# Patient Record
Sex: FEMALE | Race: WHITE | Employment: FULL TIME | URBAN - METROPOLITAN AREA
[De-identification: names, ages, dates, MRNs, and addresses within clinical notes are randomized per-mention and may not be internally consistent; named-entity substitution may affect disease eponyms.]

---

## 2018-10-29 ENCOUNTER — OFFICE VISIT (OUTPATIENT)
Dept: FAMILY MEDICINE CLINIC | Facility: CLINIC | Age: 33
End: 2018-10-29

## 2018-10-29 VITALS
DIASTOLIC BLOOD PRESSURE: 64 MMHG | HEART RATE: 65 BPM | HEIGHT: 61 IN | WEIGHT: 138 LBS | SYSTOLIC BLOOD PRESSURE: 106 MMHG | BODY MASS INDEX: 26.06 KG/M2 | TEMPERATURE: 99 F

## 2018-10-29 DIAGNOSIS — Z23 NEED FOR VACCINATION: ICD-10-CM

## 2018-10-29 DIAGNOSIS — Z78.9 VEGAN DIET: ICD-10-CM

## 2018-10-29 DIAGNOSIS — Z00.00 ADULT GENERAL MEDICAL EXAM: Primary | ICD-10-CM

## 2018-10-29 DIAGNOSIS — L65.9 HAIR THINNING: ICD-10-CM

## 2018-10-29 PROCEDURE — 90471 IMMUNIZATION ADMIN: CPT | Performed by: FAMILY MEDICINE

## 2018-10-29 PROCEDURE — 99385 PREV VISIT NEW AGE 18-39: CPT | Performed by: FAMILY MEDICINE

## 2018-10-29 PROCEDURE — 90686 IIV4 VACC NO PRSV 0.5 ML IM: CPT | Performed by: FAMILY MEDICINE

## 2018-10-29 NOTE — PROGRESS NOTES
Patient ID: Sohail Jha is a 29year old female. HPI  Patient presents with:  Physical    Is not been seen in at least 3 years. She has 2 children. She sees Dr. Ran Osman her gynecologist.  She is . She works in marketing and sales.   She goes 06/14/2014     06/14/2014    CO2 27 06/14/2014       Lab Results   Component Value Date    GLU 89 06/14/2014    BUN 10 06/14/2014    CREATSERUM 0.90 06/14/2014    BUNCREA 11.1 06/14/2014    ANIONGAP 5 06/14/2014    GFRAA > 60 06/14/2014    GFRNAA > 6 Readings from Last 6 Encounters:  10/29/18 : 26.07 kg/m²  11/18/16 : 24.75 kg/m²  11/30/15 : 24.98 kg/m²  12/06/14 : 23.88 kg/m²  11/18/14 : 23.91 kg/m²  09/03/14 : 23.24 kg/m²      BP Readings from Last 6 Encounters:  10/29/18 : 106/64  11/18/16 : 100/65 History      Not on file    Tobacco Use      Smoking status: Never Smoker      Smokeless tobacco: Never Used    Substance and Sexual Activity      Alcohol use:  Yes        Alcohol/week: 0.0 oz        Comment: Beer and wine socially      Drug use: No      Se Psychiatric: She has a normal mood and affect  No scoliosis  Vitals reviewed. Blood pressure 106/64, pulse 65, temperature 99.3 °F (37.4 °C), temperature source Oral, height 5' 1\" (1.549 m), weight 138 lb (62.6 kg), not currently breastfeeding.

## 2018-11-03 ENCOUNTER — LAB ENCOUNTER (OUTPATIENT)
Dept: LAB | Age: 33
End: 2018-11-03
Attending: FAMILY MEDICINE
Payer: COMMERCIAL

## 2018-11-03 DIAGNOSIS — Z78.9 VEGAN DIET: ICD-10-CM

## 2018-11-03 DIAGNOSIS — L65.9 HAIR THINNING: ICD-10-CM

## 2018-11-03 DIAGNOSIS — Z00.00 ADULT GENERAL MEDICAL EXAM: ICD-10-CM

## 2018-11-03 PROCEDURE — 82607 VITAMIN B-12: CPT

## 2018-11-03 PROCEDURE — 36415 COLL VENOUS BLD VENIPUNCTURE: CPT

## 2018-11-03 PROCEDURE — 84443 ASSAY THYROID STIM HORMONE: CPT

## 2018-11-03 PROCEDURE — 80061 LIPID PANEL: CPT

## 2018-11-03 PROCEDURE — 85025 COMPLETE CBC W/AUTO DIFF WBC: CPT

## 2018-11-03 PROCEDURE — 80053 COMPREHEN METABOLIC PANEL: CPT

## 2018-11-03 PROCEDURE — 82306 VITAMIN D 25 HYDROXY: CPT

## 2018-11-26 ENCOUNTER — OFFICE VISIT (OUTPATIENT)
Dept: OBGYN CLINIC | Facility: CLINIC | Age: 33
End: 2018-11-26

## 2018-11-26 VITALS
WEIGHT: 136 LBS | BODY MASS INDEX: 26 KG/M2 | SYSTOLIC BLOOD PRESSURE: 95 MMHG | DIASTOLIC BLOOD PRESSURE: 63 MMHG | HEART RATE: 60 BPM

## 2018-11-26 DIAGNOSIS — Z01.419 WOMEN'S ANNUAL ROUTINE GYNECOLOGICAL EXAMINATION: Primary | ICD-10-CM

## 2018-11-26 PROCEDURE — 99395 PREV VISIT EST AGE 18-39: CPT | Performed by: OBSTETRICS & GYNECOLOGY

## 2018-11-29 NOTE — PROGRESS NOTES
Alexander Bautista is a 29year old female [id number] Patient's last menstrual period was 10/28/2018. Patient presents with:  Gyn Exam: ANNUAL  STD: wishes for testing on pap only  .     OBSTETRICS HISTORY:  OB History    Para Term  AB Living   2 2 Transfusions: Not Asked        Caffeine Concern: Yes          1-2 cups of coffee daily        Occupational Exposure: Not Asked        Hobby Hazards: Not Asked        Sleep Concern: Not Asked        Stress Concern: Not Asked        Weight Concern: Not Asked nipple retraction or skin changes  Abdomen:   soft, nontender, nondistended, no masses  Skin/Hair:  no unusual rashes or bruises  Extremities:  no edema, no cyanosis  Psychiatric:   oriented to time, place, person and situation.  Appropriate mood and affect

## 2018-11-30 ENCOUNTER — APPOINTMENT (OUTPATIENT)
Dept: GENERAL RADIOLOGY | Facility: HOSPITAL | Age: 33
End: 2018-11-30
Payer: COMMERCIAL

## 2018-11-30 ENCOUNTER — HOSPITAL ENCOUNTER (EMERGENCY)
Facility: HOSPITAL | Age: 33
Discharge: HOME | End: 2018-11-30
Attending: EMERGENCY MEDICINE
Payer: COMMERCIAL

## 2018-11-30 VITALS
HEIGHT: 61 IN | HEART RATE: 69 BPM | BODY MASS INDEX: 25.68 KG/M2 | TEMPERATURE: 97 F | DIASTOLIC BLOOD PRESSURE: 67 MMHG | RESPIRATION RATE: 18 BRPM | OXYGEN SATURATION: 100 % | SYSTOLIC BLOOD PRESSURE: 112 MMHG | WEIGHT: 136 LBS

## 2018-11-30 DIAGNOSIS — S50.02XA CONTUSION OF LEFT ELBOW, INITIAL ENCOUNTER: Primary | ICD-10-CM

## 2018-11-30 PROCEDURE — 99283 EMERGENCY DEPT VISIT LOW MDM: CPT

## 2018-11-30 PROCEDURE — 73080 X-RAY EXAM OF ELBOW: CPT | Performed by: EMERGENCY MEDICINE

## 2018-11-30 RX ORDER — IBUPROFEN 600 MG/1
600 TABLET ORAL ONCE
Status: COMPLETED | OUTPATIENT
Start: 2018-11-30 | End: 2018-11-30

## 2018-11-30 NOTE — ED PROVIDER NOTES
Patient Seen in: [facility] AND [facility] Emergency Department    History   Patient presents with:  Fall (musculoskeletal, neurologic)    Stated Complaint: mechanical fall 5 stairs    HPI    70-year-old female without significant past medical history presents w external or internal trauma by exam.  Neurological: Speech normal.  Motor and sensation is intact and symmetric to bilateral upper extremities. Skin: No laceration or abrasions.   Musculoskeletal                Head: atraumatic without scalp tenderness

## 2018-11-30 NOTE — ED INITIAL ASSESSMENT (HPI)
Pt fell this morning down 5 concrete stairs. C/o shooting pain in L elbow radiating down to L hand. Denies hitting head.

## 2019-05-27 ENCOUNTER — HOSPITAL ENCOUNTER (EMERGENCY)
Facility: HOSPITAL | Age: 34
Discharge: HOME | End: 2019-05-27
Attending: PHYSICIAN ASSISTANT
Payer: COMMERCIAL

## 2019-05-27 VITALS
RESPIRATION RATE: 16 BRPM | HEIGHT: 61 IN | SYSTOLIC BLOOD PRESSURE: 101 MMHG | HEART RATE: 59 BPM | TEMPERATURE: 98 F | WEIGHT: 134 LBS | OXYGEN SATURATION: 99 % | DIASTOLIC BLOOD PRESSURE: 64 MMHG | BODY MASS INDEX: 25.3 KG/M2

## 2019-05-27 DIAGNOSIS — K08.89 PAIN, DENTAL: Primary | ICD-10-CM

## 2019-05-27 PROCEDURE — 99283 EMERGENCY DEPT VISIT LOW MDM: CPT

## 2019-05-27 RX ORDER — HYDROCODONE BITARTRATE AND ACETAMINOPHEN 5; 325 MG/1; MG/1
1 TABLET ORAL EVERY 6 HOURS PRN
Qty: 12 TABLET | Refills: 0 | Status: SHIPPED | OUTPATIENT
Start: 2019-05-27 | End: 2019-06-03

## 2019-05-27 RX ORDER — HYDROCODONE BITARTRATE AND ACETAMINOPHEN 5; 325 MG/1; MG/1
1 TABLET ORAL ONCE
Status: COMPLETED | OUTPATIENT
Start: 2019-05-27 | End: 2019-05-27

## 2019-05-27 NOTE — ED PROVIDER NOTES
Patient Seen in: [facility] Emergency Department    History   Patient presents with:  Dental Problem (dental)    Stated Complaint: pain from wisdom teeth removal     HPI    Kenia Castañeda is a 29year old female with chief complaint right lower o 100 %   O2 Device None (Room air)       Current:/64   Pulse 59   Temp 98 °F (36.7 °C) (Temporal)   Resp 16   Ht 154.9 cm (5' 1\")   Wt 60.8 kg   LMP  (Within Weeks)   SpO2 99%   BMI 25.32 kg/m²       PULSE OX within normal limits on room air as inter and Plan     Clinical Impression:  Pain, dental  (primary encounter diagnosis)    Disposition:  Discharge    Follow-up:  Your dentist or oral surgeon    Schedule an appointment as soon as possible for a visit in 1 day  For follow-up    We recommend that yo

## 2019-05-27 NOTE — ED INITIAL ASSESSMENT (HPI)
Pt had wisdom teeth removed on Thursday, was given tramadol, tylenol #3, ibuprofen 600mg (which she last had at 0700) and amoxicillin + steroids. Pt states she is taking her antibiotics and pain medicine, finished steroids, still having pain.  Eduar canseco

## 2020-02-05 ENCOUNTER — OFFICE VISIT (OUTPATIENT)
Dept: OBGYN CLINIC | Facility: CLINIC | Age: 35
End: 2020-02-05

## 2020-02-05 VITALS
HEART RATE: 59 BPM | SYSTOLIC BLOOD PRESSURE: 106 MMHG | DIASTOLIC BLOOD PRESSURE: 73 MMHG | HEIGHT: 61 IN | WEIGHT: 137 LBS | BODY MASS INDEX: 25.86 KG/M2

## 2020-02-05 DIAGNOSIS — Z11.3 SCREEN FOR STD (SEXUALLY TRANSMITTED DISEASE): ICD-10-CM

## 2020-02-05 DIAGNOSIS — Z01.419 ENCOUNTER FOR GYNECOLOGICAL EXAMINATION WITHOUT ABNORMAL FINDING: Primary | ICD-10-CM

## 2020-02-05 PROCEDURE — 99395 PREV VISIT EST AGE 18-39: CPT | Performed by: OBSTETRICS & GYNECOLOGY

## 2020-02-06 LAB
C TRACH DNA SPEC QL NAA+PROBE: NEGATIVE
N GONORRHOEA DNA SPEC QL NAA+PROBE: NEGATIVE

## 2020-02-07 LAB — T VAGINALIS RRNA SPEC QL NAA+PROBE: NEGATIVE

## 2020-02-10 NOTE — PROGRESS NOTES
Kenia Castañeda is a 36year old female [id number] Patient's last menstrual period was 2020. Patient presents with:  Gyn Exam: Toño Person  Std Screen: wishes for full screen  .     OBSTETRICS HISTORY:  OB History    Para Term P Minutes per session: Not on file      Stress: Not on file    Relationships      Social connections:        Talks on phone: Not on file        Gets together: Not on file        Attends [employer] service: Not on file        Active member of club or organizat thoughts of harming self or others  Heme/Lymph:    denies easy bruising or bleeding      PHYSICAL EXAM:   Blood pressure 106/73, pulse 59, height 5' 1\" (1.549 m), weight 137 lb (62.1 kg), last menstrual period 02/01/2020, not currently breastfeeding.   Con Hep B, Hep C, Trep, GC/Chl/Trich screening ordered. Condoms encouraged.

## 2020-03-09 ENCOUNTER — OFFICE VISIT (OUTPATIENT)
Dept: FAMILY MEDICINE CLINIC | Facility: CLINIC | Age: 35
End: 2020-03-09

## 2020-03-09 ENCOUNTER — LAB ENCOUNTER (OUTPATIENT)
Dept: LAB | Age: 35
End: 2020-03-09
Attending: FAMILY MEDICINE
Payer: COMMERCIAL

## 2020-03-09 VITALS
DIASTOLIC BLOOD PRESSURE: 73 MMHG | HEIGHT: 61 IN | BODY MASS INDEX: 26.21 KG/M2 | WEIGHT: 138.81 LBS | SYSTOLIC BLOOD PRESSURE: 109 MMHG | TEMPERATURE: 98 F | HEART RATE: 60 BPM

## 2020-03-09 DIAGNOSIS — Z78.9 VEGETARIAN DIET: ICD-10-CM

## 2020-03-09 DIAGNOSIS — R06.83 SNORING: ICD-10-CM

## 2020-03-09 DIAGNOSIS — Z11.3 SCREEN FOR STD (SEXUALLY TRANSMITTED DISEASE): ICD-10-CM

## 2020-03-09 DIAGNOSIS — Z00.00 ADULT GENERAL MEDICAL EXAM: Primary | ICD-10-CM

## 2020-03-09 DIAGNOSIS — Z00.00 ADULT GENERAL MEDICAL EXAM: ICD-10-CM

## 2020-03-09 DIAGNOSIS — M25.551 ACUTE RIGHT HIP PAIN: ICD-10-CM

## 2020-03-09 DIAGNOSIS — R19.4 DECREASED FREQUENCY OF BOWEL MOVEMENTS: ICD-10-CM

## 2020-03-09 LAB
ALBUMIN SERPL-MCNC: 3.9 G/DL (ref 3.4–5)
ALBUMIN/GLOB SERPL: 1 {RATIO} (ref 1–2)
ALP LIVER SERPL-CCNC: 51 U/L (ref 37–98)
ALT SERPL-CCNC: 16 U/L (ref 13–56)
ANION GAP SERPL CALC-SCNC: 3 MMOL/L (ref 0–18)
AST SERPL-CCNC: 7 U/L (ref 15–37)
BASOPHILS # BLD AUTO: 0.03 X10(3) UL (ref 0–0.2)
BASOPHILS NFR BLD AUTO: 0.5 %
BILIRUB SERPL-MCNC: 0.4 MG/DL (ref 0.1–2)
BUN BLD-MCNC: 10 MG/DL (ref 7–18)
BUN/CREAT SERPL: 13.2 (ref 10–20)
CALCIUM BLD-MCNC: 9.2 MG/DL (ref 8.5–10.1)
CHLORIDE SERPL-SCNC: 106 MMOL/L (ref 98–112)
CHOLEST SMN-MCNC: 198 MG/DL (ref ?–200)
CO2 SERPL-SCNC: 29 MMOL/L (ref 21–32)
CREAT BLD-MCNC: 0.76 MG/DL (ref 0.55–1.02)
DEPRECATED RDW RBC AUTO: 43.3 FL (ref 35.1–46.3)
EOSINOPHIL # BLD AUTO: 0.09 X10(3) UL (ref 0–0.7)
EOSINOPHIL NFR BLD AUTO: 1.5 %
ERYTHROCYTE [DISTWIDTH] IN BLOOD BY AUTOMATED COUNT: 12.4 % (ref 11–15)
GLOBULIN PLAS-MCNC: 3.9 G/DL (ref 2.8–4.4)
GLUCOSE BLD-MCNC: 83 MG/DL (ref 70–99)
HBV SURFACE AG SER-ACNC: <0.1 [IU]/L
HBV SURFACE AG SERPL QL IA: NONREACTIVE
HCT VFR BLD AUTO: 41.1 % (ref 35–48)
HCV AB SERPL QL IA: NONREACTIVE
HDLC SERPL-MCNC: 60 MG/DL (ref 40–59)
HGB BLD-MCNC: 13.8 G/DL (ref 12–16)
IMM GRANULOCYTES # BLD AUTO: 0.01 X10(3) UL (ref 0–1)
IMM GRANULOCYTES NFR BLD: 0.2 %
LDLC SERPL CALC-MCNC: 122 MG/DL (ref ?–100)
LYMPHOCYTES # BLD AUTO: 2.28 X10(3) UL (ref 1–4)
LYMPHOCYTES NFR BLD AUTO: 38.5 %
M PROTEIN MFR SERPL ELPH: 7.8 G/DL (ref 6.4–8.2)
MCH RBC QN AUTO: 31.7 PG (ref 26–34)
MCHC RBC AUTO-ENTMCNC: 33.6 G/DL (ref 31–37)
MCV RBC AUTO: 94.5 FL (ref 80–100)
MONOCYTES # BLD AUTO: 0.4 X10(3) UL (ref 0.1–1)
MONOCYTES NFR BLD AUTO: 6.8 %
NEUTROPHILS # BLD AUTO: 3.11 X10 (3) UL (ref 1.5–7.7)
NEUTROPHILS # BLD AUTO: 3.11 X10(3) UL (ref 1.5–7.7)
NEUTROPHILS NFR BLD AUTO: 52.5 %
NONHDLC SERPL-MCNC: 138 MG/DL (ref ?–130)
OSMOLALITY SERPL CALC.SUM OF ELEC: 284 MOSM/KG (ref 275–295)
PATIENT FASTING Y/N/NP: YES
PATIENT FASTING Y/N/NP: YES
PLATELET # BLD AUTO: 301 10(3)UL (ref 150–450)
POTASSIUM SERPL-SCNC: 4.2 MMOL/L (ref 3.5–5.1)
RBC # BLD AUTO: 4.35 X10(6)UL (ref 3.8–5.3)
SODIUM SERPL-SCNC: 138 MMOL/L (ref 136–145)
TRIGL SERPL-MCNC: 81 MG/DL (ref 30–149)
TSI SER-ACNC: 2.06 MIU/ML (ref 0.36–3.74)
VIT B12 SERPL-MCNC: 412 PG/ML (ref 193–986)
VLDLC SERPL CALC-MCNC: 16 MG/DL (ref 0–30)
WBC # BLD AUTO: 5.9 X10(3) UL (ref 4–11)

## 2020-03-09 PROCEDURE — 84443 ASSAY THYROID STIM HORMONE: CPT

## 2020-03-09 PROCEDURE — 36415 COLL VENOUS BLD VENIPUNCTURE: CPT

## 2020-03-09 PROCEDURE — 80061 LIPID PANEL: CPT

## 2020-03-09 PROCEDURE — 86803 HEPATITIS C AB TEST: CPT

## 2020-03-09 PROCEDURE — 80053 COMPREHEN METABOLIC PANEL: CPT

## 2020-03-09 PROCEDURE — 86780 TREPONEMA PALLIDUM: CPT

## 2020-03-09 PROCEDURE — 85025 COMPLETE CBC W/AUTO DIFF WBC: CPT

## 2020-03-09 PROCEDURE — 99395 PREV VISIT EST AGE 18-39: CPT | Performed by: FAMILY MEDICINE

## 2020-03-09 PROCEDURE — 87340 HEPATITIS B SURFACE AG IA: CPT

## 2020-03-09 PROCEDURE — 82607 VITAMIN B-12: CPT

## 2020-03-09 PROCEDURE — 87389 HIV-1 AG W/HIV-1&-2 AB AG IA: CPT

## 2020-03-10 ENCOUNTER — OFFICE VISIT (OUTPATIENT)
Dept: OTOLARYNGOLOGY | Facility: CLINIC | Age: 35
End: 2020-03-10

## 2020-03-10 VITALS
HEIGHT: 61 IN | WEIGHT: 138 LBS | TEMPERATURE: 98 F | DIASTOLIC BLOOD PRESSURE: 56 MMHG | BODY MASS INDEX: 26.06 KG/M2 | SYSTOLIC BLOOD PRESSURE: 88 MMHG

## 2020-03-10 DIAGNOSIS — R06.83 SNORING: Primary | ICD-10-CM

## 2020-03-10 PROCEDURE — 99243 OFF/OP CNSLTJ NEW/EST LOW 30: CPT | Performed by: OTOLARYNGOLOGY

## 2020-03-11 LAB — T PALLIDUM AB SER QL: NEGATIVE

## 2020-03-11 NOTE — PROGRESS NOTES
Ariel Mars is a 36year old female. Patient presents with:  Consult: snoring ,patient  tells her she is snoring often    HPI:   Her  is telling her over the last few years that she has been snoring more.   She is not really aware if she i Eyes Normal Conjunctiva - Right: Normal, Left: Normal. Pupil - Right: Normal, Left: Normal.    Ears Normal Inspection - Right: Normal, Left: Normal. Canal - Left: Normal. TM - Right: Normal, Left: Normal.     ASSESSMENT AND PLAN:   1.  Snoring  She is hav

## 2020-09-11 ENCOUNTER — TELEPHONE (OUTPATIENT)
Dept: OBGYN CLINIC | Facility: CLINIC | Age: 35
End: 2020-09-11

## 2020-09-11 NOTE — TELEPHONE ENCOUNTER
Patient has a bump in her bikini line for a month that has not gone away. Patient requesting appointment with any of the female doctors. Offered Saturday appointment with , but patient declined to to work. Please call at:[phone],thanks.

## 2020-09-11 NOTE — TELEPHONE ENCOUNTER
C/O DID HER OWN BIKINI WAX AND DEVELOPED A LUMP ON VULVA. SHE POPPED IT BUT IT CAME BACK AND IS BIGGER, HARDER AND VERY SORE. APPT GIVEN WITH RONNY ON MON. SUGGESTED WARM SOAKS TO AREA FREQUENTLY EACH DAY.   IF AREA RUPTURES TO USE WARM SOAKS 1-2 MORE TIME

## 2020-09-14 ENCOUNTER — OFFICE VISIT (OUTPATIENT)
Dept: OBGYN CLINIC | Facility: CLINIC | Age: 35
End: 2020-09-14

## 2020-09-14 VITALS
DIASTOLIC BLOOD PRESSURE: 61 MMHG | HEART RATE: 61 BPM | SYSTOLIC BLOOD PRESSURE: 101 MMHG | BODY MASS INDEX: 26 KG/M2 | WEIGHT: 138 LBS

## 2020-09-14 DIAGNOSIS — L73.9 FOLLICULITIS: Primary | ICD-10-CM

## 2020-09-14 PROCEDURE — 3074F SYST BP LT 130 MM HG: CPT | Performed by: OBSTETRICS & GYNECOLOGY

## 2020-09-14 PROCEDURE — 99213 OFFICE O/P EST LOW 20 MIN: CPT | Performed by: OBSTETRICS & GYNECOLOGY

## 2020-09-14 PROCEDURE — 3078F DIAST BP <80 MM HG: CPT | Performed by: OBSTETRICS & GYNECOLOGY

## 2020-09-14 RX ORDER — SULFAMETHOXAZOLE AND TRIMETHOPRIM 800; 160 MG/1; MG/1
1 TABLET ORAL 2 TIMES DAILY
Qty: 14 TABLET | Refills: 0 | Status: SHIPPED | OUTPATIENT
Start: 2020-09-14 | End: 2020-09-24

## 2020-09-16 NOTE — PROGRESS NOTES
Juliette Santillan is a 30year old female [id number] Patient's last menstrual period was 09/07/2020. Patient presents with:  Gyn Problem: VAGINAL LUMP -- bathtub not working thus only using heating pad. Noticed after shaving. painful  .      OBSTETRICS HISTORY: Relationship status: Not on file      Intimate partner violence:        Fear of current or ex partner: Not on file        Emotionally abused: Not on file        Physically abused: Not on file        Forced sexual activity: Not on file    Other Topics Abdomen:    soft, nontender, nondistended, no masses  Skin/Hair:   no unusual rashes or bruises  Extremities:   no edema, no cyanosis  Psychiatric:    oriented to time, place, person and situation.  Appropriate mood and affect    Pelvic Exam:  External Irais

## 2021-03-11 ENCOUNTER — LAB ENCOUNTER (OUTPATIENT)
Dept: LAB | Age: 36
End: 2021-03-11
Attending: FAMILY MEDICINE
Payer: COMMERCIAL

## 2021-03-11 ENCOUNTER — OFFICE VISIT (OUTPATIENT)
Dept: FAMILY MEDICINE CLINIC | Facility: CLINIC | Age: 36
End: 2021-03-11

## 2021-03-11 VITALS
BODY MASS INDEX: 26.06 KG/M2 | TEMPERATURE: 98 F | SYSTOLIC BLOOD PRESSURE: 98 MMHG | WEIGHT: 138 LBS | HEART RATE: 69 BPM | DIASTOLIC BLOOD PRESSURE: 63 MMHG | HEIGHT: 61 IN

## 2021-03-11 DIAGNOSIS — R06.83 SNORING: ICD-10-CM

## 2021-03-11 DIAGNOSIS — Z00.00 ADULT GENERAL MEDICAL EXAM: ICD-10-CM

## 2021-03-11 DIAGNOSIS — Z11.3 SCREEN FOR STD (SEXUALLY TRANSMITTED DISEASE): ICD-10-CM

## 2021-03-11 DIAGNOSIS — Z00.00 ADULT GENERAL MEDICAL EXAM: Primary | ICD-10-CM

## 2021-03-11 DIAGNOSIS — R20.0 NUMBNESS OF RIGHT FOOT: ICD-10-CM

## 2021-03-11 DIAGNOSIS — L98.9 BENIGN SKIN LESION OF FACE: ICD-10-CM

## 2021-03-11 LAB
ALBUMIN SERPL-MCNC: 4 G/DL (ref 3.4–5)
ALBUMIN/GLOB SERPL: 1.1 {RATIO} (ref 1–2)
ALP LIVER SERPL-CCNC: 51 U/L
ALT SERPL-CCNC: 30 U/L
ANION GAP SERPL CALC-SCNC: 5 MMOL/L (ref 0–18)
AST SERPL-CCNC: 17 U/L (ref 15–37)
BASOPHILS # BLD AUTO: 0.04 X10(3) UL (ref 0–0.2)
BASOPHILS NFR BLD AUTO: 0.7 %
BILIRUB SERPL-MCNC: 0.5 MG/DL (ref 0.1–2)
BUN BLD-MCNC: 10 MG/DL (ref 7–18)
BUN/CREAT SERPL: 12.2 (ref 10–20)
CALCIUM BLD-MCNC: 8.9 MG/DL (ref 8.5–10.1)
CHLORIDE SERPL-SCNC: 106 MMOL/L (ref 98–112)
CHOLEST SMN-MCNC: 156 MG/DL (ref ?–200)
CO2 SERPL-SCNC: 27 MMOL/L (ref 21–32)
CREAT BLD-MCNC: 0.82 MG/DL
DEPRECATED RDW RBC AUTO: 43.5 FL (ref 35.1–46.3)
EOSINOPHIL # BLD AUTO: 0.05 X10(3) UL (ref 0–0.7)
EOSINOPHIL NFR BLD AUTO: 0.9 %
ERYTHROCYTE [DISTWIDTH] IN BLOOD BY AUTOMATED COUNT: 12.7 % (ref 11–15)
GLOBULIN PLAS-MCNC: 3.5 G/DL (ref 2.8–4.4)
GLUCOSE BLD-MCNC: 84 MG/DL (ref 70–99)
HCT VFR BLD AUTO: 39.5 %
HDLC SERPL-MCNC: 56 MG/DL (ref 40–59)
HGB BLD-MCNC: 13.1 G/DL
IMM GRANULOCYTES # BLD AUTO: 0 X10(3) UL (ref 0–1)
IMM GRANULOCYTES NFR BLD: 0 %
LDLC SERPL CALC-MCNC: 87 MG/DL (ref ?–100)
LYMPHOCYTES # BLD AUTO: 3.1 X10(3) UL (ref 1–4)
LYMPHOCYTES NFR BLD AUTO: 56.3 %
M PROTEIN MFR SERPL ELPH: 7.5 G/DL (ref 6.4–8.2)
MCH RBC QN AUTO: 31 PG (ref 26–34)
MCHC RBC AUTO-ENTMCNC: 33.2 G/DL (ref 31–37)
MCV RBC AUTO: 93.6 FL
MONOCYTES # BLD AUTO: 0.42 X10(3) UL (ref 0.1–1)
MONOCYTES NFR BLD AUTO: 7.6 %
NEUTROPHILS # BLD AUTO: 1.9 X10 (3) UL (ref 1.5–7.7)
NEUTROPHILS # BLD AUTO: 1.9 X10(3) UL (ref 1.5–7.7)
NEUTROPHILS NFR BLD AUTO: 34.5 %
NONHDLC SERPL-MCNC: 100 MG/DL (ref ?–130)
OSMOLALITY SERPL CALC.SUM OF ELEC: 284 MOSM/KG (ref 275–295)
PATIENT FASTING Y/N/NP: YES
PATIENT FASTING Y/N/NP: YES
PLATELET # BLD AUTO: 286 10(3)UL (ref 150–450)
POTASSIUM SERPL-SCNC: 4.3 MMOL/L (ref 3.5–5.1)
RBC # BLD AUTO: 4.22 X10(6)UL
SODIUM SERPL-SCNC: 138 MMOL/L (ref 136–145)
TRIGL SERPL-MCNC: 63 MG/DL (ref 30–149)
TSI SER-ACNC: 2.05 MIU/ML (ref 0.36–3.74)
VLDLC SERPL CALC-MCNC: 13 MG/DL (ref 0–30)
WBC # BLD AUTO: 5.5 X10(3) UL (ref 4–11)

## 2021-03-11 PROCEDURE — 84443 ASSAY THYROID STIM HORMONE: CPT

## 2021-03-11 PROCEDURE — 3074F SYST BP LT 130 MM HG: CPT | Performed by: FAMILY MEDICINE

## 2021-03-11 PROCEDURE — 3078F DIAST BP <80 MM HG: CPT | Performed by: FAMILY MEDICINE

## 2021-03-11 PROCEDURE — 80061 LIPID PANEL: CPT

## 2021-03-11 PROCEDURE — 3008F BODY MASS INDEX DOCD: CPT | Performed by: FAMILY MEDICINE

## 2021-03-11 PROCEDURE — 82306 VITAMIN D 25 HYDROXY: CPT

## 2021-03-11 PROCEDURE — 80053 COMPREHEN METABOLIC PANEL: CPT

## 2021-03-11 PROCEDURE — 99213 OFFICE O/P EST LOW 20 MIN: CPT | Performed by: FAMILY MEDICINE

## 2021-03-11 PROCEDURE — 36415 COLL VENOUS BLD VENIPUNCTURE: CPT

## 2021-03-11 PROCEDURE — 99395 PREV VISIT EST AGE 18-39: CPT | Performed by: FAMILY MEDICINE

## 2021-03-11 PROCEDURE — 85025 COMPLETE CBC W/AUTO DIFF WBC: CPT

## 2021-03-11 PROCEDURE — 87389 HIV-1 AG W/HIV-1&-2 AB AG IA: CPT

## 2021-03-11 NOTE — PROGRESS NOTES
Patient ID: Fozia Ingram is a 30year old female. HPI  Patient presents with:  Routine Physical    Last physical on 3/9/2020. Pt works in marketing sales. She is .      Pt states she has been exercising at the gym and when on the elliptical 03/09/2020    BAABSO 0.03 03/09/2020       Lab Results   Component Value Date    GLU 83 03/09/2020    BUN 10 03/09/2020    BUNCREA 13.2 03/09/2020    CREATSERUM 0.76 03/09/2020    ANIONGAP 3 03/09/2020    GFRNAA 103 03/09/2020    GFRAA 119 03/09/2020    CA =======================================================    Wt Readings from Last 6 Encounters:  03/11/21 : 138 lb  09/14/20 : 138 lb  03/10/20 : 138 lb  03/09/20 : 138 lb 12.8 oz  02/05/20 : 137 lb  05/27/19 : 134 lb              BMI Readings from Last 6 Seat Belt: Not Asked        Self-Exams: Not Asked    Social History Narrative      Not on file    Social Determinants of Health  Financial Resource Strain:       Difficulty of Paying Living Expenses:   Food Insecurity:       Worried About Running Out of Fo 2/4 BLE  Skin: Skin is warm and dry. No rash noted. 8 mm by 6 mm hyperpigmented lesion  Psychiatric: She has a normal mood and affect. Right foot: No swelling or redness. Good pulses. At this time full sensation of all the toes. Vitals reviewed.     B dermatology.                            +++++++SKIN DOCTOR+++++++          Referral Priority:Routine          Referral Type:OFFICE VISIT          Referred to Waylon Ashford MD          Requested Specialty:DERMATOLOGY          Number of Visits Re

## 2021-03-12 LAB — 25(OH)D3 SERPL-MCNC: 22.8 NG/ML (ref 30–100)

## 2021-03-19 ENCOUNTER — OFFICE VISIT (OUTPATIENT)
Dept: OTOLARYNGOLOGY | Facility: CLINIC | Age: 36
End: 2021-03-19

## 2021-03-19 VITALS
DIASTOLIC BLOOD PRESSURE: 60 MMHG | BODY MASS INDEX: 26.06 KG/M2 | HEIGHT: 61 IN | TEMPERATURE: 98 F | SYSTOLIC BLOOD PRESSURE: 93 MMHG | WEIGHT: 138 LBS

## 2021-03-19 DIAGNOSIS — J34.2 DEVIATED SEPTUM: ICD-10-CM

## 2021-03-19 DIAGNOSIS — R06.83 SNORING: Primary | ICD-10-CM

## 2021-03-19 PROCEDURE — 3074F SYST BP LT 130 MM HG: CPT | Performed by: OTOLARYNGOLOGY

## 2021-03-19 PROCEDURE — 99213 OFFICE O/P EST LOW 20 MIN: CPT | Performed by: OTOLARYNGOLOGY

## 2021-03-19 PROCEDURE — 3078F DIAST BP <80 MM HG: CPT | Performed by: OTOLARYNGOLOGY

## 2021-03-19 PROCEDURE — 3008F BODY MASS INDEX DOCD: CPT | Performed by: OTOLARYNGOLOGY

## 2021-03-19 NOTE — PROGRESS NOTES
Dylan Alvarado is a 30year old female. Patient presents with: Follow - Up: 1 year follow up- snoring- per pt no changes/improvement of symptoms    HPI:   She continues to experience issues with snoring. She is not having any sleep apnea symptoms.   Last Appropriate mood and affect. Lymph Detail Normal Submental. Submandibular. Anterior cervical. Posterior cervical. Supraclavicular.    Eyes Normal Conjunctiva - Right: Normal, Left: Normal. Pupil - Right: Normal, Left: Normal.    Ears Normal Inspection - R

## 2022-02-06 LAB — AMB EXT COVID-19 RESULT: DETECTED

## 2022-02-15 ENCOUNTER — HOSPITAL ENCOUNTER (OUTPATIENT)
Dept: GENERAL RADIOLOGY | Age: 37
Discharge: HOME | End: 2022-02-15
Attending: NURSE PRACTITIONER
Payer: COMMERCIAL

## 2022-02-15 ENCOUNTER — NURSE TRIAGE (OUTPATIENT)
Dept: FAMILY MEDICINE CLINIC | Facility: CLINIC | Age: 37
End: 2022-02-15

## 2022-02-15 ENCOUNTER — OFFICE VISIT (OUTPATIENT)
Dept: FAMILY MEDICINE CLINIC | Facility: CLINIC | Age: 37
End: 2022-02-15
Payer: COMMERCIAL

## 2022-02-15 VITALS
HEIGHT: 61 IN | BODY MASS INDEX: 26.81 KG/M2 | DIASTOLIC BLOOD PRESSURE: 72 MMHG | SYSTOLIC BLOOD PRESSURE: 109 MMHG | HEART RATE: 72 BPM | WEIGHT: 142 LBS

## 2022-02-15 DIAGNOSIS — Z20.822 COUGH WITH EXPOSURE TO COVID-19 VIRUS: Primary | ICD-10-CM

## 2022-02-15 DIAGNOSIS — Z20.822 COUGH WITH EXPOSURE TO COVID-19 VIRUS: ICD-10-CM

## 2022-02-15 DIAGNOSIS — R05.8 COUGH WITH EXPOSURE TO COVID-19 VIRUS: ICD-10-CM

## 2022-02-15 DIAGNOSIS — J30.9 ALLERGIC RHINITIS, UNSPECIFIED SEASONALITY, UNSPECIFIED TRIGGER: ICD-10-CM

## 2022-02-15 DIAGNOSIS — R05.8 COUGH WITH EXPOSURE TO COVID-19 VIRUS: Primary | ICD-10-CM

## 2022-02-15 PROBLEM — R19.4 DECREASED FREQUENCY OF BOWEL MOVEMENTS: Status: RESOLVED | Noted: 2020-03-09 | Resolved: 2022-02-15

## 2022-02-15 PROBLEM — R06.83 SNORING: Status: RESOLVED | Noted: 2020-03-09 | Resolved: 2022-02-15

## 2022-02-15 PROBLEM — U07.1 COVID-19: Status: ACTIVE | Noted: 2022-02-15

## 2022-02-15 PROCEDURE — 71046 X-RAY EXAM CHEST 2 VIEWS: CPT | Performed by: NURSE PRACTITIONER

## 2022-02-15 PROCEDURE — 3008F BODY MASS INDEX DOCD: CPT | Performed by: NURSE PRACTITIONER

## 2022-02-15 PROCEDURE — 99214 OFFICE O/P EST MOD 30 MIN: CPT | Performed by: NURSE PRACTITIONER

## 2022-02-15 PROCEDURE — 3074F SYST BP LT 130 MM HG: CPT | Performed by: NURSE PRACTITIONER

## 2022-02-15 PROCEDURE — 3078F DIAST BP <80 MM HG: CPT | Performed by: NURSE PRACTITIONER

## 2022-02-15 RX ORDER — MONTELUKAST SODIUM 10 MG/1
10 TABLET ORAL DAILY
Qty: 30 TABLET | Refills: 0 | COMMUNITY
Start: 2022-02-15

## 2022-02-15 RX ORDER — GUAIFENESIN AND CODEINE PHOSPHATE 100; 10 MG/5ML; MG/5ML
SOLUTION ORAL
Qty: 180 ML | Refills: 0 | COMMUNITY
Start: 2022-02-15

## 2022-02-15 NOTE — TELEPHONE ENCOUNTER
Pt scheduled with Adama Peralta today. Pt advised to bring negative COVID test results to office visit. Dr. Theo Kent fully booked.   Future Appointments   Date Time Provider Chris Rizvi   2/15/2022  2:45 PM MICHAEL [facility]

## 2022-02-15 NOTE — ASSESSMENT & PLAN NOTE
cxr today-tx to be discussed after xray  Supportive care discussed to help alleviate symptoms  Please call if symptoms worsen or are not resolving.

## 2022-06-14 ENCOUNTER — OFFICE VISIT (OUTPATIENT)
Dept: FAMILY MEDICINE CLINIC | Facility: CLINIC | Age: 37
End: 2022-06-14
Payer: COMMERCIAL

## 2022-06-14 VITALS
DIASTOLIC BLOOD PRESSURE: 64 MMHG | RESPIRATION RATE: 16 BRPM | SYSTOLIC BLOOD PRESSURE: 99 MMHG | WEIGHT: 140 LBS | HEART RATE: 78 BPM | BODY MASS INDEX: 26.43 KG/M2 | TEMPERATURE: 98 F | HEIGHT: 61 IN

## 2022-06-14 DIAGNOSIS — M54.50 LUMBAR BACK PAIN: ICD-10-CM

## 2022-06-14 DIAGNOSIS — M54.6 ACUTE BILATERAL THORACIC BACK PAIN: Primary | ICD-10-CM

## 2022-06-14 PROCEDURE — 99213 OFFICE O/P EST LOW 20 MIN: CPT

## 2022-06-14 PROCEDURE — 3008F BODY MASS INDEX DOCD: CPT

## 2022-06-14 PROCEDURE — 3074F SYST BP LT 130 MM HG: CPT

## 2022-06-14 PROCEDURE — 3078F DIAST BP <80 MM HG: CPT

## 2022-06-14 RX ORDER — CYCLOBENZAPRINE HCL 5 MG
5 TABLET ORAL NIGHTLY
Qty: 10 TABLET | Refills: 0 | Status: SHIPPED | OUTPATIENT
Start: 2022-06-14

## 2022-06-14 RX ORDER — NAPROXEN 500 MG/1
500 TABLET ORAL 2 TIMES DAILY WITH MEALS
Qty: 28 TABLET | Refills: 0 | Status: SHIPPED | OUTPATIENT
Start: 2022-06-14 | End: 2022-06-28

## 2022-08-11 ENCOUNTER — NURSE TRIAGE (OUTPATIENT)
Dept: FAMILY MEDICINE CLINIC | Facility: CLINIC | Age: 37
End: 2022-08-11

## 2022-08-12 ENCOUNTER — OFFICE VISIT (OUTPATIENT)
Dept: FAMILY MEDICINE CLINIC | Facility: CLINIC | Age: 37
End: 2022-08-12
Payer: COMMERCIAL

## 2022-08-12 VITALS
BODY MASS INDEX: 26.43 KG/M2 | DIASTOLIC BLOOD PRESSURE: 74 MMHG | WEIGHT: 140 LBS | HEIGHT: 61 IN | HEART RATE: 60 BPM | SYSTOLIC BLOOD PRESSURE: 112 MMHG

## 2022-08-12 DIAGNOSIS — K64.4 EXTERNAL HEMORRHOID: Primary | ICD-10-CM

## 2022-08-12 PROCEDURE — 99213 OFFICE O/P EST LOW 20 MIN: CPT | Performed by: PHYSICIAN ASSISTANT

## 2022-08-12 PROCEDURE — 3078F DIAST BP <80 MM HG: CPT | Performed by: PHYSICIAN ASSISTANT

## 2022-08-12 PROCEDURE — 3074F SYST BP LT 130 MM HG: CPT | Performed by: PHYSICIAN ASSISTANT

## 2022-08-12 PROCEDURE — 3008F BODY MASS INDEX DOCD: CPT | Performed by: PHYSICIAN ASSISTANT

## 2022-08-12 RX ORDER — HYDROCORTISONE 25 MG/G
1 CREAM TOPICAL 2 TIMES DAILY
Qty: 28 G | Refills: 1 | Status: SHIPPED | OUTPATIENT
Start: 2022-08-12

## 2022-08-25 ENCOUNTER — NURSE TRIAGE (OUTPATIENT)
Dept: FAMILY MEDICINE CLINIC | Facility: CLINIC | Age: 37
End: 2022-08-25

## 2022-08-26 ENCOUNTER — OFFICE VISIT (OUTPATIENT)
Dept: FAMILY MEDICINE CLINIC | Facility: CLINIC | Age: 37
End: 2022-08-26
Payer: COMMERCIAL

## 2022-08-26 VITALS
SYSTOLIC BLOOD PRESSURE: 103 MMHG | HEIGHT: 61 IN | DIASTOLIC BLOOD PRESSURE: 70 MMHG | HEART RATE: 90 BPM | BODY MASS INDEX: 26.81 KG/M2 | WEIGHT: 142 LBS

## 2022-08-26 DIAGNOSIS — N89.8 VAGINAL DISCHARGE: Primary | ICD-10-CM

## 2022-08-26 DIAGNOSIS — T19.2XXA RETAINED TAMPON, INITIAL ENCOUNTER: ICD-10-CM

## 2022-08-26 PROCEDURE — 87808 TRICHOMONAS ASSAY W/OPTIC: CPT

## 2022-08-26 PROCEDURE — 87106 FUNGI IDENTIFICATION YEAST: CPT

## 2022-08-26 PROCEDURE — 87205 SMEAR GRAM STAIN: CPT

## 2022-08-26 RX ORDER — AMOXICILLIN AND CLAVULANATE POTASSIUM 875; 125 MG/1; MG/1
1 TABLET, FILM COATED ORAL 2 TIMES DAILY
Qty: 20 TABLET | Refills: 0 | Status: SHIPPED | OUTPATIENT
Start: 2022-08-26 | End: 2022-08-29 | Stop reason: SINTOL

## 2022-08-28 LAB
GENITAL VAGINOSIS SCREEN: NEGATIVE
TRICHOMONAS SCREEN: NEGATIVE

## 2022-08-29 ENCOUNTER — TELEPHONE (OUTPATIENT)
Dept: FAMILY MEDICINE CLINIC | Facility: CLINIC | Age: 37
End: 2022-08-29

## 2022-08-29 RX ORDER — CLINDAMYCIN HYDROCHLORIDE 300 MG/1
300 CAPSULE ORAL 3 TIMES DAILY
Qty: 30 CAPSULE | Refills: 0 | Status: SHIPPED | OUTPATIENT
Start: 2022-08-29 | End: 2022-09-08

## 2022-08-29 NOTE — TELEPHONE ENCOUNTER
Condition update:  Saw Luca Andrade APRJENNIFER on 8/26/2022, found with a tampon that had been left inside. Treated with Augmentin, started it Saturday 8/27. On Sunday 8/28 had severe diarrhea, 7 stools yesterday, 2 over night, 3 so far today. Stools are watery. Denies blood, fever. Vaginal symptoms better. She is taking probiotic. Denies abdominal cramping or unusual odor to stool. Luca Andrade MICHAEL please advise:  Patient asking if she should take AM dose of Augmentin today? Or does she need to change to different antibiotic? Discussed BRAT diet, avoid dairy, avoid raw fruits/vegetables or high roughage for now.

## 2022-08-29 NOTE — TELEPHONE ENCOUNTER
Please advise patient to stop Augmentin. Start clindamycin 300 mg three times a day x 10 days. Please continue probiotic with medication.
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
## 2022-09-03 PROBLEM — T19.2XXA RETAINED TAMPON: Status: ACTIVE | Noted: 2022-09-03

## 2022-11-11 ENCOUNTER — IMMUNIZATION (OUTPATIENT)
Dept: FAMILY MEDICINE CLINIC | Facility: CLINIC | Age: 37
End: 2022-11-11
Payer: COMMERCIAL

## 2022-11-11 DIAGNOSIS — Z23 NEED FOR VACCINATION: Primary | ICD-10-CM

## 2022-11-11 PROCEDURE — 90471 IMMUNIZATION ADMIN: CPT | Performed by: FAMILY MEDICINE

## 2022-11-11 PROCEDURE — 90686 IIV4 VACC NO PRSV 0.5 ML IM: CPT | Performed by: FAMILY MEDICINE

## 2023-01-16 ENCOUNTER — OFFICE VISIT (OUTPATIENT)
Dept: FAMILY MEDICINE CLINIC | Facility: CLINIC | Age: 38
End: 2023-01-16

## 2023-01-16 VITALS
SYSTOLIC BLOOD PRESSURE: 109 MMHG | DIASTOLIC BLOOD PRESSURE: 74 MMHG | HEIGHT: 61 IN | WEIGHT: 142 LBS | BODY MASS INDEX: 26.81 KG/M2 | TEMPERATURE: 97 F | HEART RATE: 68 BPM

## 2023-01-16 DIAGNOSIS — E55.9 VITAMIN D DEFICIENCY: ICD-10-CM

## 2023-01-16 DIAGNOSIS — Z00.00 ADULT GENERAL MEDICAL EXAM: Primary | ICD-10-CM

## 2023-01-16 DIAGNOSIS — L91.8 SKIN TAG: ICD-10-CM

## 2023-01-16 DIAGNOSIS — Z23 NEED FOR VACCINATION: ICD-10-CM

## 2023-01-16 PROCEDURE — 3078F DIAST BP <80 MM HG: CPT | Performed by: FAMILY MEDICINE

## 2023-01-16 PROCEDURE — 90471 IMMUNIZATION ADMIN: CPT | Performed by: FAMILY MEDICINE

## 2023-01-16 PROCEDURE — 99395 PREV VISIT EST AGE 18-39: CPT | Performed by: FAMILY MEDICINE

## 2023-01-16 PROCEDURE — 90715 TDAP VACCINE 7 YRS/> IM: CPT | Performed by: FAMILY MEDICINE

## 2023-01-16 PROCEDURE — 3008F BODY MASS INDEX DOCD: CPT | Performed by: FAMILY MEDICINE

## 2023-01-16 PROCEDURE — 3074F SYST BP LT 130 MM HG: CPT | Performed by: FAMILY MEDICINE

## 2023-01-16 NOTE — PATIENT INSTRUCTIONS
Start taking 2000 international units of vitamin D daily. poorly controlled w/ Hb A1c 9.2%, oddly on Levemir 15 units in AM, 20 in PM  - c/w 10 am and 15 units in pm  - c/w 2 units w/ meals   - c/w DM diet, BUD  - RD consult  - started on statin ()  - BP above goal, losartan 25 mg

## 2023-01-17 ENCOUNTER — LAB ENCOUNTER (OUTPATIENT)
Dept: LAB | Age: 38
End: 2023-01-17
Attending: FAMILY MEDICINE
Payer: COMMERCIAL

## 2023-01-17 DIAGNOSIS — Z00.00 ADULT GENERAL MEDICAL EXAM: ICD-10-CM

## 2023-01-17 DIAGNOSIS — L91.8 SKIN TAG: ICD-10-CM

## 2023-01-17 DIAGNOSIS — E55.9 VITAMIN D DEFICIENCY: ICD-10-CM

## 2023-01-17 LAB
ALBUMIN SERPL-MCNC: 3.8 G/DL (ref 3.4–5)
ALBUMIN/GLOB SERPL: 1.1 {RATIO} (ref 1–2)
ALP LIVER SERPL-CCNC: 49 U/L
ALT SERPL-CCNC: 16 U/L
ANION GAP SERPL CALC-SCNC: 8 MMOL/L (ref 0–18)
AST SERPL-CCNC: 11 U/L (ref 15–37)
BASOPHILS # BLD AUTO: 0.05 X10(3) UL (ref 0–0.2)
BASOPHILS NFR BLD AUTO: 0.6 %
BILIRUB SERPL-MCNC: 0.6 MG/DL (ref 0.1–2)
BUN BLD-MCNC: 8 MG/DL (ref 7–18)
BUN/CREAT SERPL: 9.8 (ref 10–20)
CALCIUM BLD-MCNC: 9.3 MG/DL (ref 8.5–10.1)
CHLORIDE SERPL-SCNC: 107 MMOL/L (ref 98–112)
CHOLEST SERPL-MCNC: 151 MG/DL (ref ?–200)
CO2 SERPL-SCNC: 25 MMOL/L (ref 21–32)
CREAT BLD-MCNC: 0.82 MG/DL
DEPRECATED RDW RBC AUTO: 41.7 FL (ref 35.1–46.3)
EOSINOPHIL # BLD AUTO: 0.1 X10(3) UL (ref 0–0.7)
EOSINOPHIL NFR BLD AUTO: 1.2 %
ERYTHROCYTE [DISTWIDTH] IN BLOOD BY AUTOMATED COUNT: 12.1 % (ref 11–15)
FASTING PATIENT LIPID ANSWER: YES
FASTING STATUS PATIENT QL REPORTED: YES
GFR SERPLBLD BASED ON 1.73 SQ M-ARVRAT: 95 ML/MIN/1.73M2 (ref 60–?)
GLOBULIN PLAS-MCNC: 3.4 G/DL (ref 2.8–4.4)
GLUCOSE BLD-MCNC: 101 MG/DL (ref 70–99)
HCT VFR BLD AUTO: 39.7 %
HDLC SERPL-MCNC: 54 MG/DL (ref 40–59)
HGB BLD-MCNC: 13.4 G/DL
IMM GRANULOCYTES # BLD AUTO: 0.02 X10(3) UL (ref 0–1)
IMM GRANULOCYTES NFR BLD: 0.2 %
INSULIN SERPL-ACNC: 6.8 MU/L (ref 3–25)
LDLC SERPL CALC-MCNC: 77 MG/DL (ref ?–100)
LYMPHOCYTES # BLD AUTO: 2.4 X10(3) UL (ref 1–4)
LYMPHOCYTES NFR BLD AUTO: 29.6 %
MCH RBC QN AUTO: 31.7 PG (ref 26–34)
MCHC RBC AUTO-ENTMCNC: 33.8 G/DL (ref 31–37)
MCV RBC AUTO: 93.9 FL
MONOCYTES # BLD AUTO: 0.6 X10(3) UL (ref 0.1–1)
MONOCYTES NFR BLD AUTO: 7.4 %
NEUTROPHILS # BLD AUTO: 4.94 X10 (3) UL (ref 1.5–7.7)
NEUTROPHILS # BLD AUTO: 4.94 X10(3) UL (ref 1.5–7.7)
NEUTROPHILS NFR BLD AUTO: 61 %
NONHDLC SERPL-MCNC: 97 MG/DL (ref ?–130)
OSMOLALITY SERPL CALC.SUM OF ELEC: 288 MOSM/KG (ref 275–295)
PLATELET # BLD AUTO: 298 10(3)UL (ref 150–450)
POTASSIUM SERPL-SCNC: 3.7 MMOL/L (ref 3.5–5.1)
PROT SERPL-MCNC: 7.2 G/DL (ref 6.4–8.2)
RBC # BLD AUTO: 4.23 X10(6)UL
SODIUM SERPL-SCNC: 140 MMOL/L (ref 136–145)
TRIGL SERPL-MCNC: 109 MG/DL (ref 30–149)
TSI SER-ACNC: 2.14 MIU/ML (ref 0.36–3.74)
VIT D+METAB SERPL-MCNC: 22.8 NG/ML (ref 30–100)
VLDLC SERPL CALC-MCNC: 17 MG/DL (ref 0–30)
WBC # BLD AUTO: 8.1 X10(3) UL (ref 4–11)

## 2023-01-17 PROCEDURE — 83525 ASSAY OF INSULIN: CPT

## 2023-01-17 PROCEDURE — 80053 COMPREHEN METABOLIC PANEL: CPT

## 2023-01-17 PROCEDURE — 36415 COLL VENOUS BLD VENIPUNCTURE: CPT

## 2023-01-17 PROCEDURE — 84443 ASSAY THYROID STIM HORMONE: CPT

## 2023-01-17 PROCEDURE — 82306 VITAMIN D 25 HYDROXY: CPT

## 2023-01-17 PROCEDURE — 85025 COMPLETE CBC W/AUTO DIFF WBC: CPT

## 2023-01-17 PROCEDURE — 80061 LIPID PANEL: CPT

## 2023-01-24 ENCOUNTER — OFFICE VISIT (OUTPATIENT)
Dept: OBGYN CLINIC | Facility: CLINIC | Age: 38
End: 2023-01-24

## 2023-01-24 VITALS
BODY MASS INDEX: 27 KG/M2 | HEART RATE: 62 BPM | DIASTOLIC BLOOD PRESSURE: 69 MMHG | WEIGHT: 143.38 LBS | SYSTOLIC BLOOD PRESSURE: 104 MMHG

## 2023-01-24 DIAGNOSIS — Z01.419 ENCOUNTER FOR GYNECOLOGICAL EXAMINATION WITHOUT ABNORMAL FINDING: Primary | ICD-10-CM

## 2023-01-24 PROBLEM — N89.8 VAGINAL DISCHARGE: Status: RESOLVED | Noted: 2022-08-26 | Resolved: 2023-01-24

## 2023-01-24 PROBLEM — T19.2XXA RETAINED TAMPON: Status: RESOLVED | Noted: 2022-09-03 | Resolved: 2023-01-24
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
## 2023-01-24 PROCEDURE — 99395 PREV VISIT EST AGE 18-39: CPT | Performed by: OBSTETRICS & GYNECOLOGY

## 2023-01-24 PROCEDURE — 3074F SYST BP LT 130 MM HG: CPT | Performed by: OBSTETRICS & GYNECOLOGY

## 2023-01-24 PROCEDURE — 3078F DIAST BP <80 MM HG: CPT | Performed by: OBSTETRICS & GYNECOLOGY

## 2023-01-25 LAB
C TRACH DNA SPEC QL NAA+PROBE: NEGATIVE
HPV I/H RISK 1 DNA SPEC QL NAA+PROBE: NEGATIVE
N GONORRHOEA DNA SPEC QL NAA+PROBE: NEGATIVE
T VAGINALIS RRNA SPEC QL NAA+PROBE: NEGATIVE

## 2023-03-23 ENCOUNTER — OFFICE VISIT (OUTPATIENT)
Dept: DERMATOLOGY CLINIC | Facility: CLINIC | Age: 38
End: 2023-03-23

## 2023-03-23 DIAGNOSIS — L91.8 INFLAMED ACROCHORDON: Primary | ICD-10-CM

## 2023-03-23 DIAGNOSIS — L91.8 SKIN TAGS, MULTIPLE ACQUIRED: ICD-10-CM

## 2023-03-23 PROCEDURE — 99203 OFFICE O/P NEW LOW 30 MIN: CPT | Performed by: STUDENT IN AN ORGANIZED HEALTH CARE EDUCATION/TRAINING PROGRAM

## 2023-04-05 ENCOUNTER — OFFICE VISIT (OUTPATIENT)
Dept: DERMATOLOGY CLINIC | Facility: CLINIC | Age: 38
End: 2023-04-05

## 2023-04-05 DIAGNOSIS — L91.8 SKIN TAG: Primary | ICD-10-CM

## 2023-04-05 DIAGNOSIS — L91.8 INFLAMED ACROCHORDON: ICD-10-CM

## 2023-04-05 PROCEDURE — 11201 RMVL SKIN TAGS EA ADDL 10: CPT | Performed by: STUDENT IN AN ORGANIZED HEALTH CARE EDUCATION/TRAINING PROGRAM

## 2023-04-05 PROCEDURE — 11200 RMVL SKIN TAGS UP TO&INC 15: CPT | Performed by: STUDENT IN AN ORGANIZED HEALTH CARE EDUCATION/TRAINING PROGRAM

## 2023-04-10 ENCOUNTER — OFFICE VISIT (OUTPATIENT)
Dept: OBGYN CLINIC | Facility: CLINIC | Age: 38
End: 2023-04-10

## 2023-04-10 VITALS
BODY MASS INDEX: 27 KG/M2 | WEIGHT: 142 LBS | DIASTOLIC BLOOD PRESSURE: 69 MMHG | HEART RATE: 59 BPM | SYSTOLIC BLOOD PRESSURE: 111 MMHG

## 2023-04-10 DIAGNOSIS — T19.2XXA RETAINED TAMPON NOT FOUND ON EXAMINATION: Primary | ICD-10-CM

## 2023-04-10 PROCEDURE — 3074F SYST BP LT 130 MM HG: CPT | Performed by: OBSTETRICS & GYNECOLOGY

## 2023-04-10 PROCEDURE — 99212 OFFICE O/P EST SF 10 MIN: CPT | Performed by: OBSTETRICS & GYNECOLOGY

## 2023-04-10 PROCEDURE — 3078F DIAST BP <80 MM HG: CPT | Performed by: OBSTETRICS & GYNECOLOGY

## 2023-05-03 ENCOUNTER — OFFICE VISIT (OUTPATIENT)
Dept: DERMATOLOGY CLINIC | Facility: CLINIC | Age: 38
End: 2023-05-03

## 2023-05-03 DIAGNOSIS — L91.8 INFLAMED ACROCHORDON: Primary | ICD-10-CM

## 2023-05-03 PROCEDURE — 11200 RMVL SKIN TAGS UP TO&INC 15: CPT | Performed by: STUDENT IN AN ORGANIZED HEALTH CARE EDUCATION/TRAINING PROGRAM

## 2023-05-03 PROCEDURE — 11201 RMVL SKIN TAGS EA ADDL 10: CPT | Performed by: STUDENT IN AN ORGANIZED HEALTH CARE EDUCATION/TRAINING PROGRAM

## 2023-07-31 ENCOUNTER — NURSE TRIAGE (OUTPATIENT)
Dept: FAMILY MEDICINE CLINIC | Facility: CLINIC | Age: 38
End: 2023-07-31

## 2023-07-31 ENCOUNTER — OFFICE VISIT (OUTPATIENT)
Dept: FAMILY MEDICINE CLINIC | Facility: CLINIC | Age: 38
End: 2023-07-31

## 2023-07-31 VITALS
BODY MASS INDEX: 27 KG/M2 | TEMPERATURE: 98 F | HEIGHT: 61 IN | HEART RATE: 59 BPM | SYSTOLIC BLOOD PRESSURE: 98 MMHG | WEIGHT: 143 LBS | DIASTOLIC BLOOD PRESSURE: 65 MMHG

## 2023-07-31 DIAGNOSIS — K21.9 GASTROESOPHAGEAL REFLUX DISEASE, UNSPECIFIED WHETHER ESOPHAGITIS PRESENT: ICD-10-CM

## 2023-07-31 DIAGNOSIS — R35.0 URINARY FREQUENCY: Primary | ICD-10-CM

## 2023-07-31 DIAGNOSIS — R30.0 DYSURIA: ICD-10-CM

## 2023-07-31 DIAGNOSIS — R50.9 FEVER, UNSPECIFIED FEVER CAUSE: ICD-10-CM

## 2023-07-31 DIAGNOSIS — N30.90 CYSTITIS: ICD-10-CM

## 2023-07-31 LAB
APPEARANCE: CLEAR
BILIRUBIN: NEGATIVE
GLUCOSE (URINE DIPSTICK): NEGATIVE MG/DL
KETONES (URINE DIPSTICK): NEGATIVE MG/DL
MULTISTIX LOT#: ABNORMAL NUMERIC
NITRITE, URINE: NEGATIVE
PH, URINE: 5.5 (ref 4.5–8)
PROTEIN (URINE DIPSTICK): NEGATIVE MG/DL
SPECIFIC GRAVITY: <=1.005 (ref 1–1.03)
URINE-COLOR: YELLOW
UROBILINOGEN,SEMI-QN: 0.2 MG/DL (ref 0–1.9)

## 2023-07-31 PROCEDURE — 3008F BODY MASS INDEX DOCD: CPT | Performed by: FAMILY MEDICINE

## 2023-07-31 PROCEDURE — 3074F SYST BP LT 130 MM HG: CPT | Performed by: FAMILY MEDICINE

## 2023-07-31 PROCEDURE — 3078F DIAST BP <80 MM HG: CPT | Performed by: FAMILY MEDICINE

## 2023-07-31 PROCEDURE — 99214 OFFICE O/P EST MOD 30 MIN: CPT | Performed by: FAMILY MEDICINE

## 2023-07-31 PROCEDURE — 81003 URINALYSIS AUTO W/O SCOPE: CPT | Performed by: FAMILY MEDICINE

## 2023-07-31 RX ORDER — CIPROFLOXACIN 500 MG/1
500 TABLET, FILM COATED ORAL 2 TIMES DAILY
Qty: 10 TABLET | Refills: 0 | Status: SHIPPED | OUTPATIENT
Start: 2023-07-31 | End: 2023-08-05

## 2023-07-31 NOTE — TELEPHONE ENCOUNTER
Action Requested: Summary for Provider     []  Critical Lab, Recommendations Needed  [] Need Additional Advice  []   FYI    []   Need Orders  [] Need Medications Sent to Pharmacy  []  Other     SUMMARY: Per protocol advised :  Office visit     Future Appointments   Date Time Provider Chris Rizvi   2023  1:30 PM Gary Thomas DO [facility]           Reason for call: Urinary Frequency  Onset: Data Unavailable    Patient calling ( identified name and  ) reports symptoms of urinary frequency, urgency, pain and burning with urination, slight lower back pain , pressure on her bladder, slight fever  States urine is cloudy  ,  urine has slight  odor ; onset of 3 days       Has been drinking extra water and cranberry juice with slight relief     See care advice given   CAFFEINE, ALCOHOL, AND FOODS: * Avoid caffeine and alcohol, especially during the 4 hours before bedtime. * Certain foods may irritate the bladder in some people. Examples are highly spiced foods, acidic foods (tomato, grapefruit), and carbonated beverages. CALL BACK IF: * Fever occurs * Pain or burning with urination * You become worse    Patient verbalizes understanding and agrees with plan.        Reason for Disposition   Urinating more frequently than usual (i.e., frequency)    Protocols used: Urinary Symptoms-A-OH

## 2023-10-09 ENCOUNTER — OFFICE VISIT (OUTPATIENT)
Dept: INTERNAL MEDICINE CLINIC | Facility: CLINIC | Age: 38
End: 2023-10-09

## 2023-10-09 ENCOUNTER — NURSE TRIAGE (OUTPATIENT)
Dept: FAMILY MEDICINE CLINIC | Facility: CLINIC | Age: 38
End: 2023-10-09

## 2023-10-09 VITALS
OXYGEN SATURATION: 97 % | BODY MASS INDEX: 28.13 KG/M2 | HEIGHT: 61 IN | HEART RATE: 71 BPM | WEIGHT: 149 LBS | DIASTOLIC BLOOD PRESSURE: 58 MMHG | SYSTOLIC BLOOD PRESSURE: 100 MMHG

## 2023-10-09 DIAGNOSIS — M54.50 LUMBAR BACK PAIN: Primary | ICD-10-CM

## 2023-10-09 PROCEDURE — 3078F DIAST BP <80 MM HG: CPT | Performed by: NURSE PRACTITIONER

## 2023-10-09 PROCEDURE — 3074F SYST BP LT 130 MM HG: CPT | Performed by: NURSE PRACTITIONER

## 2023-10-09 PROCEDURE — 3008F BODY MASS INDEX DOCD: CPT | Performed by: NURSE PRACTITIONER

## 2023-10-09 PROCEDURE — 99213 OFFICE O/P EST LOW 20 MIN: CPT | Performed by: NURSE PRACTITIONER

## 2023-10-09 RX ORDER — TIZANIDINE 2 MG/1
TABLET ORAL
Qty: 40 TABLET | Refills: 0 | Status: SHIPPED | OUTPATIENT
Start: 2023-10-09

## 2023-10-09 RX ORDER — METHYLPREDNISOLONE 4 MG/1
TABLET ORAL
Qty: 1 EACH | Refills: 0 | Status: SHIPPED | OUTPATIENT
Start: 2023-10-09

## 2023-10-09 NOTE — PATIENT INSTRUCTIONS
Ice areas of discomfort 15 to 20  minutes four times per day.      REST    Medrol Ajit    Tizanidine at night/ Do not drive

## 2023-10-09 NOTE — ASSESSMENT & PLAN NOTE
Patient complains of lower back pain. No sciatica noted. Pain occurs on movement. She describes the pain as a lower back stiffness. Denies any pain on urination. No frequency or urgency. No loss of bowel or bladder. Plan     Ice areas of discomfort 15 to 20  minutes four times per day. REST    Medrol Ajit    Tizanidine at night/ Do not drive    Patient to call if symptoms do not improve.

## 2023-10-09 NOTE — TELEPHONE ENCOUNTER
Action Requested: Summary for Provider     []  Critical Lab, Recommendations Needed  [] Need Additional Advice  []   FYI    []   Need Orders  [] Need Medications Sent to Pharmacy  []  Other     SUMMARY: Per Protocol disposition advised to be seen for back pain for 5 days. Assisted patient with appt scheduling, verbalized understanding and agrees to plan. Future Appointments   Date Time Provider Chris Belkys   10/9/2023  5:20 PM MICHAEL Huertas [facility]     Reason for call: Back Pain  Onset: 5 days    Patient (name and  verified) c/o back pain for a few days. Patient c/o lower back pain. Denies any injury. Denies numbness. Patient is ambulatory. Denies blood in urine. Patient requesting an appt. There are limited FM appts today. Patient is currently at work so appt scheduled around work schedule.    Reason for Disposition   MODERATE back pain (e.g., interferes with normal activities) and present > 3 days    Protocols used: Back Pain-A-[place]

## 2024-01-02 ENCOUNTER — HOSPITAL ENCOUNTER (OUTPATIENT)
Age: 39
Discharge: HOME | End: 2024-01-02
Payer: COMMERCIAL

## 2024-01-02 VITALS
TEMPERATURE: 98 F | SYSTOLIC BLOOD PRESSURE: 119 MMHG | DIASTOLIC BLOOD PRESSURE: 76 MMHG | RESPIRATION RATE: 16 BRPM | OXYGEN SATURATION: 100 % | HEART RATE: 59 BPM

## 2024-01-02 DIAGNOSIS — Z20.822 ENCOUNTER FOR LABORATORY TESTING FOR COVID-19 VIRUS: Primary | ICD-10-CM

## 2024-01-02 DIAGNOSIS — J06.9 VIRAL UPPER RESPIRATORY TRACT INFECTION: ICD-10-CM

## 2024-01-02 LAB
POCT INFLUENZA A: NEGATIVE
POCT INFLUENZA B: NEGATIVE
SARS-COV-2 RNA RESP QL NAA+PROBE: NOT DETECTED

## 2024-01-02 PROCEDURE — U0002 COVID-19 LAB TEST NON-CDC: HCPCS

## 2024-01-02 PROCEDURE — 99213 OFFICE O/P EST LOW 20 MIN: CPT

## 2024-01-02 PROCEDURE — 87502 INFLUENZA DNA AMP PROBE: CPT

## 2024-01-02 NOTE — ED PROVIDER NOTES
Patient Seen in: [facility]      History     Chief Complaint   Patient presents with    Nasal Congestion     Stated Complaint: Sinus Pressure    Subjective:   Radha is a 38-year-old female presenting to the immediate care complaining of congestion, rhinorrhea, postnasal drip and sinus pressure for the past 3 days.  Patient states she has had a mild cough.  No episodes of respiratory distress or difficulty breathing.  Denies any sore throat or difficulty swallowing.  Patient does not have a headache or dizziness.  She denies any fever, chest pain, shortness of breath, abdominal pain or vomiting.  She is eating and drinking well and is well-hydrated.  She denies any other concerns or complaints.          Objective:   Past Medical History:   Diagnosis Date    History of pregnancy      x2 FT              History reviewed. No pertinent surgical history.             Social History     Socioeconomic History    Marital status:    Tobacco Use    Smoking status: Never    Smokeless tobacco: Never   Vaping Use    Vaping Use: Never used   Substance and Sexual Activity    Alcohol use: Yes     Alcohol/week: 0.0 standard drinks of alcohol     Comment: Beer and wine socially    Drug use: No    Sexual activity: Yes     Partners: Male     Birth control/protection: Condom   Other Topics Concern    Caffeine Concern Yes     Comment: 1-2 cups of coffee daily    Reaction to local anesthetic No    Pt has a pacemaker No    Pt has a defibrillator No              Review of Systems   HENT:  Positive for congestion, rhinorrhea and sinus pressure.    All other systems reviewed and are negative.      Positive for stated complaint: Sinus Pressure  Other systems are as noted in HPI.  Constitutional and vital signs reviewed.      All other systems reviewed and negative except as noted above.    Physical Exam     ED Triage Vitals [24 1335]   /76   Pulse 59   Resp 16   Temp 98 °F (36.7 °C)   Temp src Oral   SpO2  100 %   O2 Device None (Room air)       Current:/76   Pulse 59   Temp 98 °F (36.7 °C) (Oral)   Resp 16   LMP 12/06/2023 (Approximate)   SpO2 100%         Physical Exam  Vitals and nursing note reviewed.   Constitutional:       General: She is not in acute distress.     Appearance: Normal appearance. She is not ill-appearing, toxic-appearing or diaphoretic.   HENT:      Head: Normocephalic.      Right Ear: Tympanic membrane, ear canal and external ear normal.      Left Ear: Tympanic membrane, ear canal and external ear normal.      Nose: Congestion and rhinorrhea present.      Mouth/Throat:      Mouth: Mucous membranes are moist.      Pharynx: Oropharynx is clear.   Eyes:      Conjunctiva/sclera: Conjunctivae normal.   Cardiovascular:      Rate and Rhythm: Normal rate and regular rhythm.      Pulses: Normal pulses.      Heart sounds: Normal heart sounds.   Pulmonary:      Effort: Pulmonary effort is normal. No respiratory distress.      Breath sounds: Normal breath sounds. No stridor. No wheezing, rhonchi or rales.   Abdominal:      General: Abdomen is flat.   Musculoskeletal:         General: Normal range of motion.      Cervical back: Normal range of motion.   Skin:     General: Skin is warm.      Capillary Refill: Capillary refill takes less than 2 seconds.   Neurological:      General: No focal deficit present.      Mental Status: She is alert and oriented to person, place, and time.   Psychiatric:         Mood and Affect: Mood normal.         Behavior: Behavior normal.         Thought Content: Thought content normal.         Judgment: Judgment normal.              ED Course     Labs Reviewed   POCT FLU TEST - Normal    Narrative:     This assay is a rapid molecular in vitro test utilizing nucleic acid amplification of influenza A and B viral RNA.   RAPID SARS-COV-2 BY PCR - Normal          MDM           Medical Decision Making  Multiple medical diagnoses were considered including but not limited to  viral versus bacterial etiology of upper respiratory complaints.  Patient is well appearing, non-toxic and in no acute distress.  Vital signs are stable.   Influenza test is negative.  COVID test is negative.  There are no signs of infection on physical exam.  History and physical exam are consistent with viral illness.  Recommended that patient drink plenty of fluids, use Tylenol and Motrin for pain or fever, use Flonase for nasal congestion and may use over-the-counter medications for congestion as needed.  Recommended that if the patient develops any chest pain, respiratory complaints, fever that does not improve with medications or any other concerning complaints they should go to the emergency department.    ED precautions discussed.  Patient advised to follow up with PCP in 2-3 days.  Patient agrees with this plan of care.  Patient verbalizes understanding of discharge instructions and plan of care.      Amount and/or Complexity of Data Reviewed  Labs: ordered. Decision-making details documented in ED Course.    Risk  OTC drugs.        Disposition and Plan     Clinical Impression:  1. Encounter for laboratory testing for COVID-19 virus    2. Viral upper respiratory tract infection         Disposition:  Discharge  1/2/2024  2:05 pm    Follow-up:  Gary Thomas DO  [address] [place] [id number]  [phone]                Medications Prescribed:  Discharge Medication List as of 1/2/2024  2:08 PM

## 2024-01-02 NOTE — DISCHARGE INSTRUCTIONS
Influenza test is negative.  COVID test is negative.  There are no signs of infection on physical exam.  This is likely a viral illness.  Please be sure to drink plenty of fluids, use Tylenol and Motrin for pain or fever.  Use Flonase and Mucinex for congestion.  If you develop any respiratory complaints, fever that does not improve with medications or any other concerning complaints you should go to the emergency department. Otherwise follow up with your primary care provider.

## 2024-06-10 ENCOUNTER — LAB ENCOUNTER (OUTPATIENT)
Dept: LAB | Age: 39
End: 2024-06-10
Attending: FAMILY MEDICINE
Payer: COMMERCIAL

## 2024-06-10 ENCOUNTER — HOSPITAL ENCOUNTER (OUTPATIENT)
Dept: GENERAL RADIOLOGY | Age: 39
Discharge: HOME | End: 2024-06-10
Attending: FAMILY MEDICINE
Payer: COMMERCIAL

## 2024-06-10 ENCOUNTER — OFFICE VISIT (OUTPATIENT)
Dept: FAMILY MEDICINE CLINIC | Facility: CLINIC | Age: 39
End: 2024-06-10
Payer: COMMERCIAL

## 2024-06-10 VITALS
SYSTOLIC BLOOD PRESSURE: 110 MMHG | DIASTOLIC BLOOD PRESSURE: 74 MMHG | WEIGHT: 146 LBS | HEART RATE: 65 BPM | HEIGHT: 61 IN | TEMPERATURE: 97 F | BODY MASS INDEX: 27.56 KG/M2

## 2024-06-10 DIAGNOSIS — E55.9 VITAMIN D DEFICIENCY: ICD-10-CM

## 2024-06-10 DIAGNOSIS — R19.4 DECREASED FREQUENCY OF BOWEL MOVEMENTS: Chronic | ICD-10-CM

## 2024-06-10 DIAGNOSIS — E55.9 VITAMIN D DEFICIENCY: Chronic | ICD-10-CM

## 2024-06-10 DIAGNOSIS — M24.851 SNAPPING HIP SYNDROME, RIGHT: ICD-10-CM

## 2024-06-10 DIAGNOSIS — L82.1 SK (SEBORRHEIC KERATOSIS): Chronic | ICD-10-CM

## 2024-06-10 DIAGNOSIS — Z78.9 VEGETARIAN DIET: ICD-10-CM

## 2024-06-10 DIAGNOSIS — Z00.00 ADULT GENERAL MEDICAL EXAM: Primary | ICD-10-CM

## 2024-06-10 DIAGNOSIS — Z12.83 SKIN CANCER SCREENING: ICD-10-CM

## 2024-06-10 DIAGNOSIS — R19.4 DECREASED FREQUENCY OF BOWEL MOVEMENTS: ICD-10-CM

## 2024-06-10 DIAGNOSIS — M24.851 SNAPPING HIP SYNDROME, RIGHT: Chronic | ICD-10-CM

## 2024-06-10 DIAGNOSIS — Z00.00 ADULT GENERAL MEDICAL EXAM: ICD-10-CM

## 2024-06-10 LAB
ALBUMIN SERPL-MCNC: 4.5 G/DL (ref 3.2–4.8)
ALBUMIN/GLOB SERPL: 1.7 {RATIO} (ref 1–2)
ALP LIVER SERPL-CCNC: 50 U/L
ALT SERPL-CCNC: 7 U/L
ANION GAP SERPL CALC-SCNC: 7 MMOL/L (ref 0–18)
AST SERPL-CCNC: 15 U/L (ref ?–34)
BASOPHILS # BLD AUTO: 0.07 X10(3) UL (ref 0–0.2)
BASOPHILS NFR BLD AUTO: 0.9 %
BILIRUB SERPL-MCNC: 0.5 MG/DL (ref 0.3–1.2)
BUN BLD-MCNC: 9 MG/DL (ref 9–23)
BUN/CREAT SERPL: 10.8 (ref 10–20)
CALCIUM BLD-MCNC: 9.4 MG/DL (ref 8.7–10.4)
CHLORIDE SERPL-SCNC: 109 MMOL/L (ref 98–112)
CHOLEST SERPL-MCNC: 179 MG/DL (ref ?–200)
CO2 SERPL-SCNC: 26 MMOL/L (ref 21–32)
CREAT BLD-MCNC: 0.83 MG/DL
DEPRECATED RDW RBC AUTO: 41.7 FL (ref 35.1–46.3)
EGFRCR SERPLBLD CKD-EPI 2021: 93 ML/MIN/1.73M2 (ref 60–?)
EOSINOPHIL # BLD AUTO: 0.09 X10(3) UL (ref 0–0.7)
EOSINOPHIL NFR BLD AUTO: 1.2 %
ERYTHROCYTE [DISTWIDTH] IN BLOOD BY AUTOMATED COUNT: 12.1 % (ref 11–15)
FASTING PATIENT LIPID ANSWER: YES
FASTING STATUS PATIENT QL REPORTED: YES
GLOBULIN PLAS-MCNC: 2.6 G/DL (ref 2–3.5)
GLUCOSE BLD-MCNC: 101 MG/DL (ref 70–99)
HCT VFR BLD AUTO: 38.8 %
HDLC SERPL-MCNC: 48 MG/DL (ref 40–59)
HGB BLD-MCNC: 13.6 G/DL
IMM GRANULOCYTES # BLD AUTO: 0.01 X10(3) UL (ref 0–1)
IMM GRANULOCYTES NFR BLD: 0.1 %
LDLC SERPL CALC-MCNC: 116 MG/DL (ref ?–100)
LYMPHOCYTES # BLD AUTO: 2.51 X10(3) UL (ref 1–4)
LYMPHOCYTES NFR BLD AUTO: 33.9 %
MCH RBC QN AUTO: 32.6 PG (ref 26–34)
MCHC RBC AUTO-ENTMCNC: 35.1 G/DL (ref 31–37)
MCV RBC AUTO: 93 FL
MONOCYTES # BLD AUTO: 0.49 X10(3) UL (ref 0.1–1)
MONOCYTES NFR BLD AUTO: 6.6 %
NEUTROPHILS # BLD AUTO: 4.24 X10 (3) UL (ref 1.5–7.7)
NEUTROPHILS # BLD AUTO: 4.24 X10(3) UL (ref 1.5–7.7)
NEUTROPHILS NFR BLD AUTO: 57.3 %
NONHDLC SERPL-MCNC: 131 MG/DL (ref ?–130)
OSMOLALITY SERPL CALC.SUM OF ELEC: 293 MOSM/KG (ref 275–295)
PLATELET # BLD AUTO: 298 10(3)UL (ref 150–450)
POTASSIUM SERPL-SCNC: 4.5 MMOL/L (ref 3.5–5.1)
PROT SERPL-MCNC: 7.1 G/DL (ref 5.7–8.2)
RBC # BLD AUTO: 4.17 X10(6)UL
SODIUM SERPL-SCNC: 142 MMOL/L (ref 136–145)
TRIGL SERPL-MCNC: 79 MG/DL (ref 30–149)
TSI SER-ACNC: 1.75 MIU/ML (ref 0.55–4.78)
VIT B12 SERPL-MCNC: 497 PG/ML (ref 211–911)
VIT D+METAB SERPL-MCNC: 24.8 NG/ML (ref 30–100)
VLDLC SERPL CALC-MCNC: 14 MG/DL (ref 0–30)
WBC # BLD AUTO: 7.4 X10(3) UL (ref 4–11)

## 2024-06-10 PROCEDURE — 99395 PREV VISIT EST AGE 18-39: CPT | Performed by: FAMILY MEDICINE

## 2024-06-10 PROCEDURE — 99213 OFFICE O/P EST LOW 20 MIN: CPT | Performed by: FAMILY MEDICINE

## 2024-06-10 PROCEDURE — 73502 X-RAY EXAM HIP UNI 2-3 VIEWS: CPT | Performed by: FAMILY MEDICINE

## 2024-06-10 PROCEDURE — 82306 VITAMIN D 25 HYDROXY: CPT

## 2024-06-10 PROCEDURE — 80053 COMPREHEN METABOLIC PANEL: CPT

## 2024-06-10 PROCEDURE — 74018 RADEX ABDOMEN 1 VIEW: CPT | Performed by: FAMILY MEDICINE

## 2024-06-10 PROCEDURE — 82607 VITAMIN B-12: CPT

## 2024-06-10 PROCEDURE — 80061 LIPID PANEL: CPT

## 2024-06-10 PROCEDURE — 3074F SYST BP LT 130 MM HG: CPT | Performed by: FAMILY MEDICINE

## 2024-06-10 PROCEDURE — 85025 COMPLETE CBC W/AUTO DIFF WBC: CPT

## 2024-06-10 PROCEDURE — 36415 COLL VENOUS BLD VENIPUNCTURE: CPT

## 2024-06-10 PROCEDURE — 3008F BODY MASS INDEX DOCD: CPT | Performed by: FAMILY MEDICINE

## 2024-06-10 PROCEDURE — 3078F DIAST BP <80 MM HG: CPT | Performed by: FAMILY MEDICINE

## 2024-06-10 PROCEDURE — 84443 ASSAY THYROID STIM HORMONE: CPT

## 2024-06-10 NOTE — PROGRESS NOTES
Patient ID: Radha Vega is a 38 year old female.    HPI  Chief Complaint   Patient presents with    Routine Physical     She is here for physical but has some other concerns.  She drinks a lot of water and has fiber and even take some coffee at times but she states she will have a bowel movement perhaps every 5 days.  Her abdomen does not bother her.  She is not straining when she has to go.  People told her she should have a bowel movement daily so she got a bit worried.  She is not having any vomiting.  She does not feel that she is constipated.  She states she just has a bowel movement when she feels like she needs to have a bowel movement.  She states today being Monday, her last bowel movement was Thursday.  She does not feel bloated.    She is up and goes to the gym.  She states that she feels a clicking or snapping in the right hip area when she does leg raises.  It is not painful.  She does not have any trouble walking.    She states she saw dermatology who did a wonderful job with her skin tags but she has another lesion on her preauricular area on the left.  Not bothersome but it is a bit raised.  She would like to have that evaluated.      Health Maintenance   Topic Date Due    COVID-19 Vaccine (5 - 2023-24 season) 09/01/2023    Gyne Pelvic Exam  01/24/2024    Influenza Vaccine (Season Ended) 10/01/2024    Annual Physical  06/10/2025    Pap Smear  01/24/2026    DTaP,Tdap,and Td Vaccines (3 - Td or Tdap) 01/16/2033    Annual Depression Screening  Completed    Pneumococcal Vaccine: Birth to 64yrs  Aged Out       =======================================================    Lab Results   Component Value Date    WBC 8.1 01/17/2023    RBC 4.23 01/17/2023    HGB 13.4 01/17/2023    HCT 39.7 01/17/2023    .0 01/17/2023    MPV 9.9 11/03/2018    MCV 93.9 01/17/2023    MCH 31.7 01/17/2023    MCHC 33.8 01/17/2023    RDW 12.1 01/17/2023    NEPRELIM 4.94 01/17/2023    NEUT 2.9 06/14/2014    LYMPH 1.5  06/14/2014    MON 0.4 06/14/2014    EOS 0.1 06/14/2014    BASO 0.0 06/14/2014    NEPERCENT 61.0 01/17/2023    LYPERCENT 29.6 01/17/2023    MOPERCENT 7.4 01/17/2023    EOPERCENT 1.2 01/17/2023    BAPERCENT 0.6 01/17/2023    NE 4.94 01/17/2023    LYMABS 2.40 01/17/2023    MOABSO 0.60 01/17/2023    EOABSO 0.10 01/17/2023    BAABSO 0.05 01/17/2023       Lab Results   Component Value Date     (H) 01/17/2023    BUN 8 01/17/2023    BUNCREA 9.8 (L) 01/17/2023    CREATSERUM 0.82 01/17/2023    ANIONGAP 8 01/17/2023    GFRNAA 94 03/11/2021    GFRAA 108 03/11/2021    CA 9.3 01/17/2023    OSMOCALC 288 01/17/2023    ALKPHO 49 01/17/2023    AST 11 (L) 01/17/2023    ALT 16 01/17/2023    ALKPHOS 33 06/14/2014    BILT 0.6 01/17/2023    TP 7.2 01/17/2023    ALB 3.8 01/17/2023    GLOBULIN 3.4 01/17/2023    AGRATIO 1.1 06/14/2014     01/17/2023    K 3.7 01/17/2023     01/17/2023    CO2 25.0 01/17/2023       Lab Results   Component Value Date     (H) 01/17/2023    BUN 8 01/17/2023    CREATSERUM 0.82 01/17/2023    BUNCREA 9.8 (L) 01/17/2023    ANIONGAP 8 01/17/2023    GFRAA 108 03/11/2021    GFRNAA 94 03/11/2021    CA 9.3 01/17/2023     01/17/2023    K 3.7 01/17/2023     01/17/2023    CO2 25.0 01/17/2023    OSMOCALC 288 01/17/2023       Lab Results   Component Value Date    COLORUR Yellow 10/27/2012    CLARITY Cloudy (A) 10/27/2012    SPECGRAVITY <=1.005 07/31/2023    GLUUR NEG 10/27/2012    BILUR NEG 10/27/2012    KETUR NEG 10/27/2012    BLOODURINE TR (A) 10/27/2012    PHURINE 5.5 07/31/2023    PROUR NEG 10/27/2012    UROBILINOGEN <2.0 10/27/2012    NITRITE Negative 07/31/2023    LEUUR LG (A) 10/27/2012    ASCACIDURINE NEG 10/27/2012    WBCUR 191 (H) 10/27/2012    RBCUR 3 10/27/2012    EPIUR FEW 10/27/2012    MICCOM Completed 10/27/2012     No results found for: \"EAG\", \"A1C\"    No results found for: \"MALBP\", \"CREUR\", \"CREAURINE\", \"MIALBURINE\", \"MCRRATIOUR\", \"MALBCRECALC\", \"MICROALBUMIN\", \"CREAUR\",  \"MALBCREACALC\"    Lab Results   Component Value Date    CHOLEST 151 2023    TRIG 109 2023    HDL 54 2023    LDL 77 2023    VLDL 17 2023    NONHDLC 97 2023    CALCNONHDL 91 2014    CALCNONHDL 116 05/10/2013     TSH (mIU/mL)   Date Value   2023 2.140     TSH (S) (uIU/mL)   Date Value   2014 1.66       Lab Results   Component Value Date    B12 412 2020       No results found for: \"IRON\", \"IRONTOT\"    No results found for: \"SAT\"    No results found for: \"IRON\", \"IRONTOT\", \"TIBC\", \"IRONSAT\", \"TRANSFERRIN\", \"TIBCP\", \"IRONBIND\", \"SAT\", \"SATUR\"    No results found for: \"ZAHRAA\"    Lab Results   Component Value Date    VITD 22.8 (L) 2023     No results found for: \"PSA\", \"QPSA\", \"TOTPSASCREEN\"    =======================================================    Wt Readings from Last 6 Encounters:   06/10/24 146 lb (66.2 kg)   10/09/23 149 lb (67.6 kg)   23 143 lb (64.9 kg)   04/10/23 142 lb (64.4 kg)   23 143 lb 6.4 oz (65 kg)   23 142 lb (64.4 kg)               BMI Readings from Last 6 Encounters:   06/10/24 27.59 kg/m²   10/09/23 28.15 kg/m²   23 27.02 kg/m²   04/10/23 26.83 kg/m²   23 27.10 kg/m²   23 26.83 kg/m²       BP Readings from Last 6 Encounters:   06/10/24 110/74   24 119/76   10/09/23 100/58   23 98/65   04/10/23 111/69   23 104/69         Review of Systems  No chest pain or trouble breathing.    Past Medical History:    History of pregnancy     x2 FT       No past surgical history on file.    Social History     Socioeconomic History    Marital status:      Spouse name: Not on file    Number of children: Not on file    Years of education: Not on file    Highest education level: Not on file   Occupational History    Not on file   Tobacco Use    Smoking status: Never    Smokeless tobacco: Never   Vaping Use    Vaping status: Never Used   Substance and Sexual Activity    Alcohol use: Yes      Alcohol/week: 0.0 standard drinks of alcohol     Comment: Beer and wine socially    Drug use: No    Sexual activity: Yes     Partners: Male     Birth control/protection: Condom   Other Topics Concern     Service Not Asked    Blood Transfusions Not Asked    Caffeine Concern Yes     Comment: 1-2 cups of coffee daily    Occupational Exposure Not Asked    Hobby Hazards Not Asked    Sleep Concern Not Asked    Stress Concern Not Asked    Weight Concern Not Asked    Special Diet Not Asked    Back Care Not Asked    Exercise Not Asked    Bike Helmet Not Asked    Seat Belt Not Asked    Self-Exams Not Asked    Grew up on a farm Not Asked    History of tanning Not Asked    Outdoor occupation Not Asked    Breast feeding Not Asked    Reaction to local anesthetic No    Pt has a pacemaker No    Pt has a defibrillator No   Social History Narrative    Not on file     Social Determinants of Health     Financial Resource Strain: Not on file   Food Insecurity: Not on file   Transportation Needs: Not on file   Physical Activity: Not on file   Stress: Not on file   Social Connections: Not on file   Housing Stability: Not on file          Current Outpatient Medications   Medication Sig Dispense Refill    tiZANidine 2 MG Oral Tab 1-2 tabs at bedtime as needed for pain. (Patient not taking: Reported on 1/2/2024) 40 tablet 0    methylPREDNISolone (MEDROL) 4 MG Oral Tablet Therapy Pack As directed. (Patient not taking: Reported on 1/2/2024) 1 each 0     Allergies:No Known Allergies   PHYSICAL EXAM:   Physical Exam  Physical Exam   Constitutional: . She appears well-developed and well-nourished. No distress.   HENT:   Head: Normocephalic.   Right Ear: Tympanic membrane and ear canal normal.   Left Ear: Tympanic membrane and ear canal normal.   Mouth/Throat: Oropharynx is clear and moist and mucous membranes are normal.   Eyes: Conjunctivae and EOM are normal. Pupils are equal, round, and reactive to light.   Neck: Normal range of  motion. Neck supple. No thyromegaly present.   Cardiovascular: Normal rate, regular rhythm and no murmur heard.  Pulmonary/Chest: Effort normal and breath sounds normal. No respiratory distress.   Abdominal: Soft. Bowel sounds are normal. There is no hepatosplenomegaly. There is no tenderness.  Not distended or tender  Lymphadenopathy:     She has no  cervical adenopathy.   Neurological: She is alert and oriented to person, place, and time . She has normal reflexes. No cranial nerve deficit.   Skin: Skin is warm and dry. No rash noted.  She has a 2 mm slightly raised seborrheic keratosis at the left preauricular area.  She has a few skin tags that are very tiny on the neck.  No suspicious lesions  Psychiatric: She has a normal mood and affect   Right hip: Good internal and external rotation of the right hip.  No tenderness over the ASIS or inguinal area.  I could not hear or feel a click or snap of the right hip.  Vitals reviewed.    Blood pressure 110/74, pulse 65, temperature 97 °F (36.1 °C), temperature source Temporal, height 5' 1\" (1.549 m), weight 146 lb (66.2 kg), last menstrual period 12/06/2023, not currently breastfeeding.         ASSESSMENT/PLAN:     Diagnoses and all orders for this visit:    Adult general medical exam  -     CBC With Differential With Platelet; Future  -     Comp Metabolic Panel (14); Future  -     Lipid Panel; Future  -     Assay, Thyroid Stim Hormone; Future    Vitamin D deficiency  -     Vitamin D; Future  Not as compliant on vitamin D as she should be.  She does have some at home.  I told her she may just want to take a multivitamin with vitamin D in it.  Vegetarian diet  -     Vitamin B12; Future    Decreased frequency of bowel movements  -     XR ABDOMEN (1 VIEW) (CPT=74018); Future  We will go ahead and do an x-ray of the abdomen just to make sure that she is not backed up and full stool although she has no complaints of this.  Snapping hip syndrome, right  -     XR HIP + PELVIS  MIN 4 VIEWS RIGHT (CPT=73503); Future  We will do an x-ray of the right hip but I did let her know that snapping hip syndrome is really quite benign and considering that she is not having any discomfort or pain, we will do an x-ray just to make sure there is no bony abnormality.  SK (seborrheic keratosis)  -     DERM - INTERNAL    Skin cancer screening  -     DERM - INTERNAL        Referrals (if applicable)  Orders Placed This Encounter   Procedures    DERM - INTERNAL     If he is busy, you may see one of his partners.     Referral Priority:   Routine     Referral Type:   OFFICE VISIT     Referred to Provider:   Jemal Keen MD     Requested Specialty:   DERMATOLOGY     Number of Visits Requested:   3         Follow up if symptoms persist.  Take medicine (if given) as prescribed.  Approach to treatment discussed and patient/family member understands and agrees to plan.     No follow-ups on file.      Gary Thomas DO  6/10/2024

## 2024-08-31 ENCOUNTER — OFFICE VISIT (OUTPATIENT)
Dept: OBGYN CLINIC | Facility: CLINIC | Age: 39
End: 2024-08-31
Payer: COMMERCIAL

## 2024-08-31 VITALS
BODY MASS INDEX: 29 KG/M2 | WEIGHT: 151.81 LBS | SYSTOLIC BLOOD PRESSURE: 108 MMHG | DIASTOLIC BLOOD PRESSURE: 71 MMHG | HEART RATE: 75 BPM

## 2024-08-31 DIAGNOSIS — Z01.419 ENCOUNTER FOR GYNECOLOGICAL EXAMINATION WITHOUT ABNORMAL FINDING: Primary | ICD-10-CM

## 2024-08-31 PROCEDURE — 3074F SYST BP LT 130 MM HG: CPT | Performed by: OBSTETRICS & GYNECOLOGY

## 2024-08-31 PROCEDURE — 3078F DIAST BP <80 MM HG: CPT | Performed by: OBSTETRICS & GYNECOLOGY

## 2024-08-31 PROCEDURE — 99395 PREV VISIT EST AGE 18-39: CPT | Performed by: OBSTETRICS & GYNECOLOGY

## 2024-08-31 NOTE — PROGRESS NOTES
Radha Vega is a 39 year old female  Patient's last menstrual period was 2024 (exact date).   Chief Complaint   Patient presents with    Physical     annual   .    OBSTETRICS HISTORY:     OB History    Para Term  AB Living   2 2 2 0 0 2   SAB IAB Ectopic Multiple Live Births   0 0 0 0 2      # Outcome Date GA Lbr Johnathon/2nd Weight Sex Type Anes PTL Lv   2 Term 06     NORMAL SPONT   JAYNE   1 Term 02     NORMAL SPONT   JAYNE       GYNE HISTORY:     Periods regular monthly      BCM:  Condoms    History   Sexual Activity    Sexual activity: Yes    Partners: Male    Birth control/ protection: Condom        Menarche: 11  Use of Birth Control (if yes, specify type): condoms  Pap Date: 23  Pap Result Notes: Neg Pap/HPV  Follow Up Recommendation: Annual 23 NJG          Latest Ref Rng & Units 2023     2:52 PM 2018     7:37 PM   RECENT PAP RESULTS   Thinprep Pap Negative for intraepithelial lesion or malignancy Negative for intraepithelial lesion or malignancy  Negative for intraepithelial lesion or malignancy    HPV Negative Negative  Negative          MEDICAL HISTORY:     Past Medical History:    History of pregnancy     x2 FT     No past surgical history on file.  OB History    Para Term  AB Living   2 2 2 0 0 2   SAB IAB Ectopic Multiple Live Births   0 0 0 0 2        SOCIAL HISTORY:     Tobacco Use: Low Risk  (2024)    Patient History     Smoking Tobacco Use: Never     Smokeless Tobacco Use: Never     Passive Exposure: Not on file       FAMILY HISTORY:     Family History   Problem Relation Age of Onset    Hypertension Father     Diabetes Maternal Grandmother     Breast Cancer Maternal Grandmother         cause of death         MEDICATIONS:     No current outpatient medications on file.    ALLERGIES:     No Known Allergies      REVIEW OF SYSTEMS:     Constitutional:    denies fever / chills  Eyes:     denies blurred or double  vision  Cardiovascular:  denies chest pain or palpitations  Respiratory:    denies shortness of breath  Gastrointestinal:  denies severe abdominal pain, frequent diarrhea or constipation, nausea / vomiting  Genitourinary:    denies dysuria, bothersome incontinence  Skin/Breast:   denies any breast pain, lumps, or discharge  Neurological:    denies frequent severe headaches  Psychiatric:   denies depression or anxiety, thoughts of harming self or others  Heme/Lymph:    denies easy bruising or bleeding      PHYSICAL EXAM:   Blood pressure 108/71, pulse 75, weight 151 lb 12.8 oz (68.9 kg), last menstrual period 08/29/2024, not currently breastfeeding.  Constitutional:  well developed, well nourished  Head/Face:  normocephalic  Neck/Thyroid: thyroid symmetric, no thyromegaly, no nodules, no adenopathy  Lymphatic: no abnormal supraclavicular or axillary adenopathy is noted  Breast:   normal without palpable masses, tenderness, asymmetry, nipple discharge, nipple retraction or skin changes  Abdomen:   soft, nontender, nondistended, no masses  Skin/Hair:  no unusual rashes or bruises  Extremities:  no edema, no cyanosis  Psychiatric:   oriented to time, place, person and situation. Appropriate mood and affect    Pelvic Exam:  External Genitalia:  normal appearance, hair distribution, and no lesions  Urethral Meatus:   normal in size, location, without lesions and prolapse  Bladder:    no fullness, masses or tenderness  Vagina:    normal appearance without lesions, no abnormal discharge  Cervix:    normal without tenderness on motion  Uterus:    normal in size, contour, position, mobility, without tenderness  Adnexa:   normal without masses or tenderness  Perineum:   normal  Anus: no hemorroids         ASSESSMENT & PLAN:     Radha was seen today for physical.    Diagnoses and all orders for this visit:    Encounter for gynecological examination without abnormal finding        SUMMARY:  Pap: Next cotest 1/26-28 per ASCCP  guidelines.  BCM:  Condoms  STD screening: declines  Mammogram: n/a -- once 41 yrs old  HM updated  Depression screen:   Depression Screening (PHQ-2/PHQ-9): Over the LAST 2 WEEKS   Little interest or pleasure in doing things (over the last two weeks)?: Not at all    Feeling down, depressed, or hopeless (over the last two weeks)?: Not at all    PHQ-2 SCORE: 0          FOLLOW-UP     Return in about 1 year (around 8/31/2025) for annual gyne exam.    Note to patient and family:  The 21st Century Cures Act makes medical notes available to patients in the interest of transparency.  However, please be advised that this is a medical document.  It is intended as a peer to peer communication.  It is written in medical language and may contain abbreviations or verbiage that are technical and unfamiliar.  It may appear blunt or direct.  Medical documents are intended to carry relevant information, facts as evident, and the clinical opinion of the practitioner.

## 2025-04-14 ENCOUNTER — HOSPITAL ENCOUNTER (OUTPATIENT)
Age: 40
Discharge: HOME | End: 2025-04-14
Payer: COMMERCIAL

## 2025-04-14 VITALS
TEMPERATURE: 99 F | HEART RATE: 79 BPM | SYSTOLIC BLOOD PRESSURE: 112 MMHG | RESPIRATION RATE: 18 BRPM | OXYGEN SATURATION: 100 % | DIASTOLIC BLOOD PRESSURE: 59 MMHG

## 2025-04-14 DIAGNOSIS — H00.015 HORDEOLUM EXTERNUM LEFT LOWER EYELID: Primary | ICD-10-CM

## 2025-04-14 DIAGNOSIS — H10.33 ACUTE CONJUNCTIVITIS OF BOTH EYES, UNSPECIFIED ACUTE CONJUNCTIVITIS TYPE: ICD-10-CM

## 2025-04-14 PROCEDURE — 99213 OFFICE O/P EST LOW 20 MIN: CPT | Performed by: PHYSICIAN ASSISTANT

## 2025-04-14 RX ORDER — ERYTHROMYCIN 5 MG/G
1 OINTMENT OPHTHALMIC EVERY 6 HOURS
Qty: 3.5 G | Refills: 0 | Status: SHIPPED | OUTPATIENT
Start: 2025-04-14 | End: 2025-04-21

## 2025-04-14 NOTE — ED PROVIDER NOTES
Chief Complaint   Patient presents with    Eye Visual Problem       HPI:     Radha Vega is a 39 year old female who presents for evaluation of left lower eyelid swelling developing yesterday with bilateral eye discharge this morning upon waking, denies contact lens use.  Denies sick exposure recent illness or URI symptoms.  Denies associated headache eye pain blurred vision photophobia.      PFSH    PFS asessment screens reviewed and agree.  Nurses notes reviewed I agree with documentation.    Family History[1]  Family history reviewed with patient/caregiver and is not pertinent to presenting problem.  Social History     Socioeconomic History    Marital status:      Spouse name: Not on file    Number of children: Not on file    Years of education: Not on file    Highest education level: Not on file   Occupational History    Not on file   Tobacco Use    Smoking status: Never    Smokeless tobacco: Never   Vaping Use    Vaping status: Never Used   Substance and Sexual Activity    Alcohol use: Yes     Alcohol/week: 0.0 standard drinks of alcohol     Comment: Beer and wine socially    Drug use: No    Sexual activity: Yes     Partners: Male     Birth control/protection: Condom   Other Topics Concern     Service Not Asked    Blood Transfusions Not Asked    Caffeine Concern Yes     Comment: 1-2 cups of coffee daily    Occupational Exposure Not Asked    Hobby Hazards Not Asked    Sleep Concern Not Asked    Stress Concern Not Asked    Weight Concern Not Asked    Special Diet Not Asked    Back Care Not Asked    Exercise Not Asked    Bike Helmet Not Asked    Seat Belt Not Asked    Self-Exams Not Asked    Grew up on a farm Not Asked    History of tanning Not Asked    Outdoor occupation Not Asked    Breast feeding Not Asked    Reaction to local anesthetic No    Pt has a pacemaker No    Pt has a defibrillator No   Social History Narrative    Not on file     Social Drivers of Health     Food Insecurity: Not on  file   Transportation Needs: Not on file   Housing Stability: Not on file         ROS:   Positive for stated complaint: Eye irritation.  All other systems reviewed and negative except as noted above.  Constitutional and Vital Signs Reviewed.      Physical Exam:     Findings:    /59   Pulse 79   Temp 98.5 °F (36.9 °C) (Oral)   Resp 18   LMP 03/14/2025 (Exact Date)   SpO2 100%   GENERAL: well developed, well nourished, well hydrated, no distress  SKIN: good skin turgor, no obvious rashes  EXTREMITIES: no cyanosis or edema. SAMPSON without difficulty  GI: soft, non-tender, normal bowel sounds  HEAD: normocephalic, atraumatic  EYES: Pupils PERRL, conjunctive injected bilaterally mild discharge along the eyelids without chemosis.  Visualized left internal inferior lateral hordeolum.  No blepharitis proptosis or periorbital swelling.  EARS: TMs clear bilaterally. Canals clear.  NOSE: nasal turbinates: pink, normal mucosa  THROAT: clear, without exudates, uvula midline, and airway patent  NEURO: No focal deficits  PSYCH: Alert and oriented x3.  Answering questions appropriately.  Mood appropriate.    MDM/Assessment/Plan:   Orders for this encounter:    Orders Placed This Encounter    erythromycin 5 MG/GM Ophthalmic Ointment     Sig: Place 1 Application into both eyes every 6 (six) hours for 7 days.     Dispense:  3.5 g     Refill:  0       Labs performed this visit:  No results found for this or any previous visit (from the past 10 hours).    MDM:  Patient agrees based on presentation history for topical antibiotic versus systemic based on impression and evaluation, will follow-up outpatient through primary versus ophthalmology over the days ahead, happy with plan of care.    Diagnosis:    ICD-10-CM    1. Hordeolum externum left lower eyelid  H00.015       2. Acute conjunctivitis of both eyes, unspecified acute conjunctivitis type  H10.33           All results reviewed and discussed with patient.  See AVS for  detailed discharge instructions for your condition today.    Follow Up with:  Gary Thomas DO  [address] [facility] [address]  [place] [address]  [phone]    Schedule an appointment as soon as possible for a visit in 3 days  As needed         [1]   Family History  Problem Relation Age of Onset    Hypertension Father     Diabetes Maternal Grandmother     Breast Cancer Maternal Grandmother         cause of death

## 2025-04-18 ENCOUNTER — OFFICE VISIT (OUTPATIENT)
Dept: INTERNAL MEDICINE CLINIC | Facility: CLINIC | Age: 40
End: 2025-04-18
Payer: COMMERCIAL

## 2025-04-18 VITALS
HEART RATE: 62 BPM | WEIGHT: 150 LBS | DIASTOLIC BLOOD PRESSURE: 73 MMHG | SYSTOLIC BLOOD PRESSURE: 106 MMHG | BODY MASS INDEX: 28.32 KG/M2 | HEIGHT: 61 IN

## 2025-04-18 DIAGNOSIS — H01.005 BLEPHARITIS OF LEFT LOWER EYELID, UNSPECIFIED TYPE: Primary | ICD-10-CM

## 2025-04-18 PROCEDURE — 3008F BODY MASS INDEX DOCD: CPT | Performed by: STUDENT IN AN ORGANIZED HEALTH CARE EDUCATION/TRAINING PROGRAM

## 2025-04-18 PROCEDURE — 3078F DIAST BP <80 MM HG: CPT | Performed by: STUDENT IN AN ORGANIZED HEALTH CARE EDUCATION/TRAINING PROGRAM

## 2025-04-18 PROCEDURE — 99203 OFFICE O/P NEW LOW 30 MIN: CPT | Performed by: STUDENT IN AN ORGANIZED HEALTH CARE EDUCATION/TRAINING PROGRAM

## 2025-04-18 PROCEDURE — 3074F SYST BP LT 130 MM HG: CPT | Performed by: STUDENT IN AN ORGANIZED HEALTH CARE EDUCATION/TRAINING PROGRAM

## 2025-04-18 RX ORDER — NEOMYCIN POLYMYXIN B SULFATES AND DEXAMETHASONE 3.5; 10000; 1 MG/ML; [USP'U]/ML; MG/ML
1 SUSPENSION/ DROPS OPHTHALMIC 4 TIMES DAILY
Qty: 5 ML | Refills: 0 | Status: SHIPPED | OUTPATIENT
Start: 2025-04-18 | End: 2025-04-25

## 2025-04-18 RX ORDER — AZITHROMYCIN 250 MG/1
TABLET, FILM COATED ORAL
Qty: 6 TABLET | Refills: 0 | Status: SHIPPED | OUTPATIENT
Start: 2025-04-18 | End: 2025-04-23

## 2025-04-21 NOTE — LETTER
Date & Time: 1/2/2024, 2:05 PM  Patient: Radha Vega  Encounter Provider(s):    Vania Elam APRN       To Whom It May Concern:    Radha Vega was seen and treated in our department on 1/2/2024. She should not return to work until 1/4/2024 .    If you have any questions or concerns, please do not hesitate to call.        _____________________________  MICHAEL Jay             
4 = No assist / stand by assistance

## 2025-08-12 ENCOUNTER — OFFICE VISIT (OUTPATIENT)
Dept: FAMILY MEDICINE CLINIC | Facility: CLINIC | Age: 40
End: 2025-08-12

## 2025-08-12 ENCOUNTER — LAB ENCOUNTER (OUTPATIENT)
Dept: LAB | Age: 40
End: 2025-08-12
Attending: FAMILY MEDICINE

## 2025-08-12 VITALS
HEART RATE: 69 BPM | SYSTOLIC BLOOD PRESSURE: 105 MMHG | HEIGHT: 61 IN | TEMPERATURE: 98 F | WEIGHT: 147.81 LBS | BODY MASS INDEX: 27.91 KG/M2 | DIASTOLIC BLOOD PRESSURE: 62 MMHG

## 2025-08-12 DIAGNOSIS — L82.1 SEBORRHEIC KERATOSIS: ICD-10-CM

## 2025-08-12 DIAGNOSIS — E55.9 VITAMIN D DEFICIENCY: ICD-10-CM

## 2025-08-12 DIAGNOSIS — Z00.00 ADULT GENERAL MEDICAL EXAM: ICD-10-CM

## 2025-08-12 DIAGNOSIS — Z00.00 ADULT GENERAL MEDICAL EXAM: Primary | ICD-10-CM

## 2025-08-12 DIAGNOSIS — L91.8 SKIN TAG: ICD-10-CM

## 2025-08-12 LAB
ALBUMIN SERPL-MCNC: 4.7 G/DL (ref 3.2–4.8)
ALBUMIN/GLOB SERPL: 1.7 (ref 1–2)
ALP LIVER SERPL-CCNC: 47 U/L (ref 37–98)
ALT SERPL-CCNC: 11 U/L (ref 10–49)
ANION GAP SERPL CALC-SCNC: 8 MMOL/L (ref 0–18)
AST SERPL-CCNC: 15 U/L (ref ?–34)
BASOPHILS # BLD AUTO: 0.05 X10(3) UL (ref 0–0.2)
BASOPHILS NFR BLD AUTO: 0.7 %
BILIRUB SERPL-MCNC: 0.7 MG/DL (ref 0.3–1.2)
BUN BLD-MCNC: 9 MG/DL (ref 9–23)
BUN/CREAT SERPL: 10 (ref 10–20)
CALCIUM BLD-MCNC: 9.5 MG/DL (ref 8.7–10.4)
CHLORIDE SERPL-SCNC: 103 MMOL/L (ref 98–112)
CHOLEST SERPL-MCNC: 187 MG/DL (ref ?–200)
CO2 SERPL-SCNC: 27 MMOL/L (ref 21–32)
CREAT BLD-MCNC: 0.9 MG/DL (ref 0.55–1.02)
DEPRECATED RDW RBC AUTO: 42.8 FL (ref 35.1–46.3)
EGFRCR SERPLBLD CKD-EPI 2021: 84 ML/MIN/1.73M2 (ref 60–?)
EOSINOPHIL # BLD AUTO: 0.09 X10(3) UL (ref 0–0.7)
EOSINOPHIL NFR BLD AUTO: 1.2 %
ERYTHROCYTE [DISTWIDTH] IN BLOOD BY AUTOMATED COUNT: 12.2 % (ref 11–15)
FASTING PATIENT LIPID ANSWER: YES
FASTING STATUS PATIENT QL REPORTED: YES
GLOBULIN PLAS-MCNC: 2.7 G/DL (ref 2–3.5)
GLUCOSE BLD-MCNC: 98 MG/DL (ref 70–99)
HCT VFR BLD AUTO: 40.2 % (ref 35–48)
HDLC SERPL-MCNC: 55 MG/DL (ref 40–59)
HGB BLD-MCNC: 13.6 G/DL (ref 12–16)
IMM GRANULOCYTES # BLD AUTO: 0.01 X10(3) UL (ref 0–1)
IMM GRANULOCYTES NFR BLD: 0.1 %
LDLC SERPL CALC-MCNC: 113 MG/DL (ref ?–100)
LYMPHOCYTES # BLD AUTO: 2.88 X10(3) UL (ref 1–4)
LYMPHOCYTES NFR BLD AUTO: 37.8 %
MCH RBC QN AUTO: 32.2 PG (ref 26–34)
MCHC RBC AUTO-ENTMCNC: 33.8 G/DL (ref 31–37)
MCV RBC AUTO: 95 FL (ref 80–100)
MONOCYTES # BLD AUTO: 0.6 X10(3) UL (ref 0.1–1)
MONOCYTES NFR BLD AUTO: 7.9 %
NEUTROPHILS # BLD AUTO: 3.99 X10 (3) UL (ref 1.5–7.7)
NEUTROPHILS # BLD AUTO: 3.99 X10(3) UL (ref 1.5–7.7)
NEUTROPHILS NFR BLD AUTO: 52.3 %
NONHDLC SERPL-MCNC: 132 MG/DL (ref ?–130)
OSMOLALITY SERPL CALC.SUM OF ELEC: 285 MOSM/KG (ref 275–295)
PLATELET # BLD AUTO: 295 10(3)UL (ref 150–450)
POTASSIUM SERPL-SCNC: 4.3 MMOL/L (ref 3.5–5.1)
PROT SERPL-MCNC: 7.4 G/DL (ref 5.7–8.2)
RBC # BLD AUTO: 4.23 X10(6)UL (ref 3.8–5.3)
SODIUM SERPL-SCNC: 138 MMOL/L (ref 136–145)
TRIGL SERPL-MCNC: 107 MG/DL (ref 30–149)
TSI SER-ACNC: 2.54 UIU/ML (ref 0.55–4.78)
VIT D+METAB SERPL-MCNC: 20.7 NG/ML (ref 30–100)
VLDLC SERPL CALC-MCNC: 18 MG/DL (ref 0–30)
WBC # BLD AUTO: 7.6 X10(3) UL (ref 4–11)

## 2025-08-12 PROCEDURE — 82306 VITAMIN D 25 HYDROXY: CPT

## 2025-08-12 PROCEDURE — 84443 ASSAY THYROID STIM HORMONE: CPT

## 2025-08-12 PROCEDURE — 85025 COMPLETE CBC W/AUTO DIFF WBC: CPT

## 2025-08-12 PROCEDURE — 80053 COMPREHEN METABOLIC PANEL: CPT

## 2025-08-12 PROCEDURE — 36415 COLL VENOUS BLD VENIPUNCTURE: CPT

## 2025-08-12 PROCEDURE — 80061 LIPID PANEL: CPT

## 2025-08-14 ENCOUNTER — TELEPHONE (OUTPATIENT)
Dept: OBGYN CLINIC | Facility: CLINIC | Age: 40
End: 2025-08-14

## 2025-08-14 DIAGNOSIS — Z32.00 PREGNANCY EXAMINATION OR TEST, PREGNANCY UNCONFIRMED: Primary | ICD-10-CM

## 2025-08-15 ENCOUNTER — LAB ENCOUNTER (OUTPATIENT)
Dept: LAB | Facility: HOSPITAL | Age: 40
End: 2025-08-15
Attending: OBSTETRICS & GYNECOLOGY

## 2025-08-15 DIAGNOSIS — Z32.00 PREGNANCY EXAMINATION OR TEST, PREGNANCY UNCONFIRMED: ICD-10-CM

## 2025-08-15 LAB — HCG SERPL QL: NEGATIVE

## 2025-08-15 PROCEDURE — 36415 COLL VENOUS BLD VENIPUNCTURE: CPT

## 2025-08-15 PROCEDURE — 84703 CHORIONIC GONADOTROPIN ASSAY: CPT

## 2025-08-18 ENCOUNTER — TELEPHONE (OUTPATIENT)
Dept: OBGYN CLINIC | Facility: CLINIC | Age: 40
End: 2025-08-18

## 2025-08-18 DIAGNOSIS — Z76.0 MEDICATION REFILL: Primary | ICD-10-CM

## 2025-08-18 RX ORDER — NORETHINDRONE ACETATE AND ETHINYL ESTRADIOL 1MG-20(21)
1 KIT ORAL DAILY
Qty: 56 TABLET | Refills: 0 | Status: SHIPPED | OUTPATIENT
Start: 2025-08-18 | End: 2026-08-18

## (undated) NOTE — ED AVS SNAPSHOT
María Elena Barbosa   MRN: [id number]    Department:  [facility] Emergency Department   Date of Visit:  11/30/2018           Disclosure     Insurance plans vary and the physician(s) referred by the ER may not be covered by your plan.  Please contact CARE PHYSICIAN AT ONCE OR RETURN IMMEDIATELY TO THE EMERGENCY DEPARTMENT. If you have been prescribed any medication(s), please fill your prescription right away and begin taking the medication(s) as directed.   If you believe that any of the medications

## (undated) NOTE — ED AVS SNAPSHOT
Dylan Alvarado   MRN: [id number]    Department:  [facility] Emergency Department   Date of Visit:  5/27/2019           Disclosure     Insurance plans vary and the physician(s) referred by the ER may not be covered by your plan.  Please contact CARE PHYSICIAN AT ONCE OR RETURN IMMEDIATELY TO THE EMERGENCY DEPARTMENT. If you have been prescribed any medication(s), please fill your prescription right away and begin taking the medication(s) as directed.   If you believe that any of the medications

## (undated) NOTE — LETTER
7/31/2023              Ruma Yu        [facility] [address]         To Whom it may concern: This is to certify that Ruma Yu had an appointment on 7/31/2023 at 9:30 AM with James Nesbitt DO. Sincerely,    James Nesbitt DO  [facility], [address], [place]  [address] [facility] [address]  [phone]        Document electronically generated by:   Jose Maria Durant

## (undated) NOTE — LETTER
Date & Time: 4/14/2025, 8:10 AM  Patient: Radha Vega  Encounter Provider(s):    Gabino Solis PA       To Whom It May Concern:    Radha Vega was seen and treated in our department on 4/14/2025. She should not return to work until TOMORROW  .    If you have any questions or concerns, please do not hesitate to call.      GABINO SOLIS   _____________________________  Physician/APC Signature

## (undated) NOTE — LETTER
2/15/2022              Kareen Key        [facility] [address]         To Whom it may concern: This is to certify that Kareen Key had an appointment on 2/15/2022 at 2:58 PM with MICHAEL Baker.         Sincerely,      MICHAEL Baker [place], [address] [place] [address]  [phone]        Document electronically generated by:  MICHAEL Baker

## (undated) NOTE — LETTER
8/12/2022          To Whom It May Concern:    Jay Chavarria is currently under my medical care and may not return to work at this time. She may return to work on 08/13/2022. Activity is restricted as follows: none. If you require additional information please contact our office.         Sincerely,    Adelle Ahumada, PA-C          Document generated by:  Adelle Ahumada, PA-C

## (undated) NOTE — LETTER
Yessica Denny, Do  [address]  [address] [facility] [address]  [address] [facility], [address] [facility]       03/11/20        Patient: Fam Webber   YOB: 1985   Date of Visit: 3/10/2020       Dear  Dr. Arthur Ponce,      Thank you for referring Fam Webber to my pra

## (undated) NOTE — LETTER
12/5/2018              Maurilio Garza        [facility] [address]         Dear [place],    It was a pleasure to see you.   You had a normal Pap and high risk HPV negative.  Negative Gonorrhea, Chlamydia, Trichomonas screening.  C